# Patient Record
Sex: FEMALE | Race: WHITE | NOT HISPANIC OR LATINO | Employment: UNEMPLOYED
[De-identification: names, ages, dates, MRNs, and addresses within clinical notes are randomized per-mention and may not be internally consistent; named-entity substitution may affect disease eponyms.]

---
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
## 2020-01-16 ENCOUNTER — COMMUNICATION - HEALTHEAST (OUTPATIENT)
Dept: TELEHEALTH | Facility: CLINIC | Age: 20
End: 2020-01-16

## 2020-01-16 ENCOUNTER — OFFICE VISIT - HEALTHEAST (OUTPATIENT)
Dept: FAMILY MEDICINE | Facility: CLINIC | Age: 20
End: 2020-01-16

## 2020-01-16 DIAGNOSIS — M25.531 PAIN IN BOTH WRISTS: ICD-10-CM

## 2020-01-16 DIAGNOSIS — Z11.3 SCREENING FOR STD (SEXUALLY TRANSMITTED DISEASE): ICD-10-CM

## 2020-01-16 DIAGNOSIS — M25.532 PAIN IN BOTH WRISTS: ICD-10-CM

## 2020-01-16 DIAGNOSIS — Z00.00 ROUTINE GENERAL MEDICAL EXAMINATION AT A HEALTH CARE FACILITY: ICD-10-CM

## 2020-01-16 LAB — HIV 1+2 AB+HIV1 P24 AG SERPL QL IA: NEGATIVE

## 2020-01-16 ASSESSMENT — MIFFLIN-ST. JEOR: SCORE: 1518.15

## 2020-01-16 NOTE — ASSESSMENT & PLAN NOTE
Patient presents to establish care.  Concern of wrist pain as discussed elsewhere.  He is sexually active with male partners and was receptive to my recommendation to undergo STI screening.  We discussed that at some point he may want to consider prophylactic antivirals if his sexual practices change.  No need for anal swab chlamydia PCR.  Return to clinic 1 year, sooner as needed.   -Discussed substance abuse (vaping, marijuana).  I discouraged him from both of these practices.

## 2020-01-16 NOTE — ASSESSMENT & PLAN NOTE
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  He is already tried an extension brace.  Working diagnosis will be carpal tunnel syndrome.  Given persistence for greater than a year, I recommended evaluation by orthopedics.  Referral placed.

## 2020-01-17 ENCOUNTER — COMMUNICATION - HEALTHEAST (OUTPATIENT)
Dept: FAMILY MEDICINE | Facility: CLINIC | Age: 20
End: 2020-01-17

## 2020-01-17 LAB
C TRACH DNA SPEC QL PROBE+SIG AMP: NEGATIVE
HAV IGM SERPL QL IA: NEGATIVE
HBV CORE IGM SERPL QL IA: NEGATIVE
HBV SURFACE AG SERPL QL IA: NEGATIVE
HCV AB SERPL QL IA: NEGATIVE
N GONORRHOEA DNA SPEC QL NAA+PROBE: NEGATIVE
T PALLIDUM AB SER QL: NEGATIVE

## 2020-01-23 ENCOUNTER — RECORDS - HEALTHEAST (OUTPATIENT)
Dept: ADMINISTRATIVE | Facility: OTHER | Age: 20
End: 2020-01-23

## 2020-05-18 ENCOUNTER — VIRTUAL VISIT (OUTPATIENT)
Dept: FAMILY MEDICINE | Facility: OTHER | Age: 20
End: 2020-05-18

## 2021-06-04 VITALS
SYSTOLIC BLOOD PRESSURE: 120 MMHG | HEIGHT: 67 IN | RESPIRATION RATE: 16 BRPM | OXYGEN SATURATION: 99 % | DIASTOLIC BLOOD PRESSURE: 60 MMHG | HEART RATE: 102 BPM | BODY MASS INDEX: 19.46 KG/M2 | WEIGHT: 124 LBS | TEMPERATURE: 97.9 F

## 2021-06-16 PROBLEM — M25.532 PAIN IN BOTH WRISTS: Status: ACTIVE | Noted: 2020-01-16

## 2021-06-16 PROBLEM — M25.531 PAIN IN BOTH WRISTS: Status: ACTIVE | Noted: 2020-01-16

## 2021-06-16 PROBLEM — Z11.3 SCREENING FOR STD (SEXUALLY TRANSMITTED DISEASE): Status: ACTIVE | Noted: 2020-01-16

## 2021-06-16 PROBLEM — Z00.00 ROUTINE GENERAL MEDICAL EXAMINATION AT A HEALTH CARE FACILITY: Status: ACTIVE | Noted: 2020-01-16

## 2021-06-20 NOTE — LETTER
Letter by Matthew Ritter MD at      Author: Matthew Ritter MD Service: -- Author Type: --    Filed:  Encounter Date: 1/17/2020 Status: Signed         Adam J Behnken  [address] [place] [address]             January 17, 2020         Dear Mr. Behnken,    Below are the results from your recent visit:    Resulted Orders   Chlamydia trachomatis & Neisseria gonorrhoeae, Amplified Detection   Result Value Ref Range    Chlamydia trachomatis, Amplified Detection Negative Negative    Neisseria gonorrhoeae, Amplified Detection Negative Negative   HIV Antigen/Antibody Screening Cascade   Result Value Ref Range    HIV Antigen / Antibody Negative Negative    Narrative    Method is Abbott HIV Ag/Ab for the detection of HIV p24 antigen, HIV-1 antibodies and HIV-2 antibodies.   Syphilis Screen, Cascade   Result Value Ref Range    Treponema Antibody (Syphilis) Negative Negative   Hepatitis Acute Evaluation   Result Value Ref Range    Hepatitis A Antibody, IgM Negative Negative    Hepatitis B [place], IgM Negative Negative    Hepatitis B Surface Ag Negative Negative    Hepatitis C Ab Negative Negative       All testing was normal/negative.    Please call with questions or contact us using [employer].    Sincerely,      Electronically signed by Matthew Ritter MD

## 2021-06-28 NOTE — PROGRESS NOTES
Progress Notes by Matthew Ritter MD at 1/16/2020  1:40 PM     Author: Matthew Ritter MD Service: -- Author Type: Physician    Filed: 1/16/2020  2:41 PM Encounter Date: 1/16/2020 Status: Signed    : Matthew Ritter MD (Physician)        [facility] Well Child Check    ASSESSMENT & PLAN  Adam J Behnken is a 19 y.o. who has normal growth and normal development.    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  He is already tried an extension brace.  Working diagnosis will be carpal tunnel syndrome.  Given persistence for greater than a year, I recommended evaluation by orthopedics.  Referral placed.    Routine general medical examination at a health care facility  Patient presents to establish care.  Concern of wrist pain as discussed elsewhere.  He is sexually active with male partners and was receptive to my recommendation to undergo STI screening.  We discussed that at some point he may want to consider prophylactic antivirals if his sexual practices change.  No need for anal swab chlamydia PCR.  Return to clinic 1 year, sooner as needed.   -Discussed substance abuse (vaping, marijuana).  I discouraged him from both of these practices.    Return for annual exam in 1 year.     IMMUNIZATIONS/LABS  No immunizations due today.    REFERRALS  Dental:  The patient has already established care with a dentist.  Other:  No additional referrals were made at this time.    ANTICIPATORY GUIDANCE  I have reviewed age appropriate anticipatory guidance.    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      Roomed by:  Nargis   Accompanied by  self   Refills needed?  no        Do you have any significant health concerns in your family history?: No  Family History   Adopted: Yes     Since  your last visit, have there been any major changes in your family, such as a move, job change, separation, divorce, or death in the family?: Yes: moved out of parents house   Has a lack of transportation kept you from medical appointments?: No    Home  Who lives in your home?:  Pt lives with roommate   Social History     Social History Narrative   ? Not on file     Do you have any concerns about losing your housing?: No  Is your housing safe and comfortable?: Yes  Do you have any trouble with sleep?:  Yes: falling asleep     Education  Pt attended [employer] for biology     Eating  Do you eat regular meals including fruits and vegetables?:  yes  What are you drinking (cow's milk, water, soda, juice, sports drinks, energy drinks, etc)?: water, tea and energy drinks   Have you been worried that you don't have enough food?: No  Do you have concerns about your body or appearance?:  No    Activities  Do you have friends?:  yes  Do you get at least one hour of physical activity per day?:  yes  How many hours a day are you in front of a screen other than for schoolwork (computer, TV, phone)?:  4-5 hours per day  What do you do for exercise?:  Work out at gym   Do you have interest/participate in community activities/volunteers/school sports?: n/a     VISION/HEARING  Vision: Completed. See Results  Hearing:  Completed. See Results     Hearing Screening    125Hz 250Hz 500Hz 1000Hz 2000Hz 3000Hz 4000Hz 6000Hz 8000Hz   Right ear:   25 20 20  20 20    Left ear:   25 20 20  20 20       Visual Acuity Screening    Right eye Left eye Both eyes   Without correction: 10/10 10/10 10/10   With correction:      Comments: Lens plus-pass       MENTAL HEALTH SCREENING    TB Risk Assessment:  The patient and/or parent/guardian answer positive to:  no known risk of TB    Dyslipidemia Risk Screening  Have either of your parents or any of your grandparents had a stroke or heart attack before age 55?: unknown   Any parents with high  "cholesterol or currently taking medications to treat?: unknown      Dental  When was the last time you saw the dentist?: 6-12 months ago   Parent/Guardian declines the fluoride varnish application today. Fluoride not applied today.    Patient Active Problem List   Diagnosis   ? Screening for STD (sexually transmitted disease)   ? Routine general medical examination at a health care facility   ? Pain in both wrists       Drugs  Does the patient use tobacco/alcohol/drugs?:  Yes.  He uses alcohol intermittently in a social way.  He receives nicotine through an [employer].  He also smokes marijuana most nights.  He has not had legal problems.  He has never injected anything into his body.    Safety  Does the patient have any safety concerns (peer or home)?:  no  Does the patient use safety belts, helmets and other safety equipment?:  yes    Sex  Have you ever had sex?:  Have any of your past or current sex partners been infected with HIV, bisexual, or injection drug users?: Yes: Male partners.  Sexually active with oral sex.  He has not been receptive partner with anal intercourse.  No sexual contact with any individuals for at least 2 months.  Are you having unprotected sex with multiple partners?:  No  Have you or any of your partners ever been treated for a sexually transmitted infections?:  No    MEASUREMENTS  Height:  5' 6.5\" (1.689 m)  Weight: 124 lb (56.2 kg)  BMI: Body mass index is 19.71 kg/m .  Blood Pressure: 120/60  Growth percentile SmartLinks can only be used for patients less than 20 years old.    PHYSICAL EXAM  Physical Exam   Constitutional: He appears well-developed and well-nourished.   HENT:   Right Ear: External ear normal.   Left Ear: External ear normal.   Nose: Nose normal.   Mouth/Throat: Oropharynx is clear and moist.   Eyes: Pupils are equal, round, and reactive to light. Conjunctivae and EOM are normal. Right eye exhibits no discharge. Left eye exhibits no discharge.   Neck: No thyromegaly " present.   Cardiovascular: Normal rate, regular rhythm and normal heart sounds. Exam reveals no gallop and no friction rub.   No murmur heard.  Pulmonary/Chest: Effort normal and breath sounds normal. No respiratory distress. He has no wheezes. He has no rales.   Abdominal: Soft. Bowel sounds are normal. He exhibits no distension and no mass. There is no abdominal tenderness. There is no rebound and no guarding.   Musculoskeletal: Normal range of motion.         General: No edema.      Comments: Normal spinal curvature.  No joint swelling or deformity.   Lymphadenopathy:     He has no cervical adenopathy.   Neurological: He is alert. He has normal reflexes. No cranial nerve deficit.   Skin: Skin is warm and dry. No rash noted.        Psychiatric: He has a normal mood and affect.

## 2022-06-20 ENCOUNTER — NURSE TRIAGE (OUTPATIENT)
Dept: NURSING | Facility: CLINIC | Age: 22
End: 2022-06-20
Payer: COMMERCIAL

## 2022-06-20 NOTE — TELEPHONE ENCOUNTER
Reason for Disposition    Spreading redness around the boil and no fever    Additional Information    Negative: Widespread rash and bright red, sunburn-like and too weak to stand    Negative: Sounds like a life-threatening emergency to the triager    Negative: Widespread red rash    Negative: Black (necrotic) color or blisters develop in wound    Negative: Patient sounds very sick or weak to the triager    Negative: SEVERE pain (e.g., excruciating)    Negative: Red streak from area of infection    Negative: Fever > 100.4 F (38.0 C)    Negative: Boil > 2 inches across (> 5 cm; larger than a golf ball or ping pong ball)    Negative: Boil > 1/2 inch across (> 12 mm; larger than a marble) and center is soft or pus colored    Protocols used: BOIL (SKIN ABSCESS)-A-OH

## 2022-06-20 NOTE — TELEPHONE ENCOUNTER
"  S-(situation): Call from patient who was scheduled for annual physical but also had a lump he wanted to have assessed.    Patient reports he has a small lump near his right nipple that started 4 days ago. Area is red, warm to the touch; lump is hard lump and tender to touch; and says it looks a bit bruised. Pain level 2-3/10. Current about a dime-size (.7 \"); redness is about 1-1.5.\" Denies fever/chills, no other skins rashes/lumps.      B-(background):       A-(assessment): needs to be evaluated      R-(recommendations): be seen today.    Reviewed care advice with caller per RN triage protocol guideline.  Advised to call back with worsening symptoms, concerns or questions.   Caller verbalized understanding.          Jing Stuart RN/[facility]      "

## 2022-09-02 ENCOUNTER — OFFICE VISIT (OUTPATIENT)
Dept: FAMILY MEDICINE | Facility: CLINIC | Age: 22
End: 2022-09-02
Payer: COMMERCIAL

## 2022-09-02 VITALS
HEIGHT: 66 IN | BODY MASS INDEX: 21.34 KG/M2 | WEIGHT: 132.8 LBS | HEART RATE: 78 BPM | RESPIRATION RATE: 16 BRPM | DIASTOLIC BLOOD PRESSURE: 81 MMHG | SYSTOLIC BLOOD PRESSURE: 121 MMHG | OXYGEN SATURATION: 99 % | TEMPERATURE: 97.8 F

## 2022-09-02 DIAGNOSIS — Z79.890 HORMONE REPLACEMENT THERAPY (HRT): ICD-10-CM

## 2022-09-02 DIAGNOSIS — F64.9 GENDER DYSPHORIA: Primary | ICD-10-CM

## 2022-09-02 PROCEDURE — 99213 OFFICE O/P EST LOW 20 MIN: CPT | Mod: GC

## 2022-09-02 RX ORDER — TRAZODONE HYDROCHLORIDE 100 MG/1
100 TABLET ORAL
COMMUNITY
Start: 2022-07-30

## 2022-09-02 RX ORDER — SPIRONOLACTONE 50 MG/1
50 TABLET, FILM COATED ORAL
COMMUNITY
Start: 2022-08-18

## 2022-09-02 RX ORDER — CLONAZEPAM 0.5 MG/1
0.5 TABLET ORAL
COMMUNITY
Start: 2022-07-27

## 2022-09-02 RX ORDER — ESTRADIOL 2 MG/1
2 TABLET ORAL
COMMUNITY
Start: 2022-08-18 | End: 2023-12-08

## 2022-09-02 RX ORDER — VENLAFAXINE HYDROCHLORIDE 37.5 MG/1
37.5 CAPSULE, EXTENDED RELEASE ORAL
COMMUNITY
Start: 2022-07-14 | End: 2023-12-06

## 2022-09-02 RX ORDER — ALBUTEROL SULFATE 90 UG/1
2 AEROSOL, METERED RESPIRATORY (INHALATION)
COMMUNITY
Start: 2022-08-02

## 2022-09-02 RX ORDER — PROGESTERONE 100 MG/1
200 CAPSULE ORAL
COMMUNITY
Start: 2022-08-18

## 2022-09-02 RX ORDER — LAMOTRIGINE 25 MG/1
50 TABLET ORAL
COMMUNITY
Start: 2022-07-07

## 2022-09-02 ASSESSMENT — ANXIETY QUESTIONNAIRES
5. BEING SO RESTLESS THAT IT IS HARD TO SIT STILL: SEVERAL DAYS
GAD7 TOTAL SCORE: 9
GAD7 TOTAL SCORE: 9
IF YOU CHECKED OFF ANY PROBLEMS ON THIS QUESTIONNAIRE, HOW DIFFICULT HAVE THESE PROBLEMS MADE IT FOR YOU TO DO YOUR WORK, TAKE CARE OF THINGS AT HOME, OR GET ALONG WITH OTHER PEOPLE: VERY DIFFICULT
7. FEELING AFRAID AS IF SOMETHING AWFUL MIGHT HAPPEN: SEVERAL DAYS
1. FEELING NERVOUS, ANXIOUS, OR ON EDGE: MORE THAN HALF THE DAYS
3. WORRYING TOO MUCH ABOUT DIFFERENT THINGS: MORE THAN HALF THE DAYS
2. NOT BEING ABLE TO STOP OR CONTROL WORRYING: SEVERAL DAYS
6. BECOMING EASILY ANNOYED OR IRRITABLE: SEVERAL DAYS

## 2022-09-02 ASSESSMENT — PATIENT HEALTH QUESTIONNAIRE - PHQ9
SUM OF ALL RESPONSES TO PHQ QUESTIONS 1-9: 5
5. POOR APPETITE OR OVEREATING: SEVERAL DAYS

## 2022-09-02 ASSESSMENT — ASTHMA QUESTIONNAIRES: ACT_TOTALSCORE: 21

## 2022-09-02 NOTE — PATIENT INSTRUCTIONS
Family therapy resources:  [facility]  Mental health resources and counseling for queer and trans youth and their families.  [email or web address]  Email: [email or web address]   Phone: [phone]   [facility]   [email or web address]  [facility]  [email or web address].    For parents:  [employer]: Understanding Our Identity Journey  12-week education group for family members of queer and trans youth. Requires advanced registration and includes a $300 fee (sliding fee available). First cycle begins September 2022. Email [email or web address] or call [phone].        If insurance doesn't cover electrolysis well:  [facility] [employer] Electrolysis support program  Provides free or greatly discounted permanent hair removal services  https://www.[facility].org/electrolysis-support-fund    Patient Education   Here is the plan from today's visit    Gender dysphoria  You should get a call in the next few weeks to start the process of bottom surgery  - Comprehensive Gender Care Referral - Internal; Future    Thank you for coming to [facility] today.  Lab Testing:  **If you had lab testing today and your results are reassuring or normal they will be mailed to you or sent through [employer] within 7 days.   **If the lab tests need quick action we will call you with the results.  **If you are having labs done on a different day, please call [phone] to schedule at [facility] or [phone] for other [facility] locations. Labs do not offer walk-in appointments.  The phone number we will call with results is # [phone] (home) . If this is not the best number please call our clinic and change the number.  Medication Refills:  If you need any refills please call your pharmacy and they will contact us.   If you need to  your refill at a new pharmacy, please contact the new pharmacy directly. The new pharmacy will help you get your  medications transferred faster.   Scheduling:  If you have any concerns about today's visit or wish to schedule another appointment please call our office during normal business hours [phone] (8-5:00 M-F)  If a referral was made to an [facility] specialty provider and you do not get a call from central scheduling, please refer to directions on your visit summary or call our office during normal business hours for assistance.   If a Mammogram was ordered for you at the [facility] call [phone] to schedule or change your appointment.  If you had an XRay/CT/Ultrasound/MRI ordered the number is [phone] to schedule or change your radiology appointment.   [facility] has limited ultrasound appointments available on Wednesdays, if you would like your ultrasound at [facility], please call [phone] to schedule.   Medical Concerns:  If you have urgent medical concerns please call [phone] at any time of the day.    Judith Garcia MD

## 2022-09-02 NOTE — PROGRESS NOTES
Assessment & Plan       This is a 22 year old patient who uses she/her prounouns here for a referral for vaginoplasty (with orchiectomy) and electrolysis, as well as information on resources for family therapy.     Gender dysphoria  Hormone replacement therapy (HRT)  Established diagnosis of gender dysphoria. On HRT for 1 month  8/18/22. Discussed usual process of electrolysis and HRT therapy for some time before surgery. She's done a lot of research and understands the process well. Placing referral today  Provided information in AVS on family therapy at mom's request  - Comprehensive Gender Care Referral - Internal; Future  - Currently on the following meds, managed by other provider, would like to continue seeing other provider for HRT but will call if she would like to switch to [facility] for primary care and HRT management.     estradiol 2mg BID (4mg)    spironolactone 50mg BID (100mg)     prometrium 200mg at bedtime (max dose)           Nicotine/Tobacco Cessation:  She reports that she has been smoking. She has never used smokeless tobacco.  Nicotine/Tobacco Cessation Plan:   Information offered: Patient not interested at this time      See Patient Instructions    No follow-ups on file.    Judith Garcia MD  [facility] SANAM Gaines is a 22 year old accompanied by her mother, presenting for the following health issues:        HPI         On HRT since 8/18/22, sees Riana for this, working with psychiatrist and psychologist to get letters for bottom surgery  Would like to get process started  Good support - supportive family and friends, financially stable, lives with family in home  Currently on  estradiol 2mg BID (4mg)  spironolactone 50mg BID (100mg)   prometrium 200mg at bedtime (max dose)      Review of Systems   Constitutional, HEENT, cardiovascular, pulmonary, gi and gu systems are negative, except as otherwise noted.      Objective    /81   Pulse 78   Temp 97.8  F (36.6  " C) (Oral)   Resp 16   Ht 1.676 m (5' 6\")   Wt 60.2 kg (132 lb 12.8 oz)   SpO2 99%   BMI 21.43 kg/m    Body mass index is 21.43 kg/m .    Physical Exam   Physical Exam    General: Alert and oriented, in no acute distress.  Skin: Warm and dry, no abnormalities noted.  Eyes: Extra-ocular muscles grossly intact, pupils equal.  ENT: Speech intact, nasal passages open, no hearing impairment noted.  CV: No cyanosis or pallor, warm and well perfused.  Respiratory: No respiratory distress, no accessory muscle use.  Neuro: Gait and station normal, comprehension intact. Gross and fine motor skills intact.   Psychiatric: Mood and affect appear normal.   Extremities: Warm, able to move all four extremities at will.          ----- Service Performed and Documented by Resident or Fellow ------          "

## 2022-09-08 NOTE — PROGRESS NOTES
Preceptor Attestation:    I discussed the patient with the resident and evaluated the patient in person. I have verified the content of the note, which accurately reflects my assessment of the patient and the plan of care.   Supervising Physician:  Pati Alvarez DO.

## 2022-09-25 ENCOUNTER — HEALTH MAINTENANCE LETTER (OUTPATIENT)
Age: 22
End: 2022-09-25

## 2022-09-28 DIAGNOSIS — F64.9 GENDER DYSPHORIA: Primary | ICD-10-CM

## 2022-10-17 ENCOUNTER — TELEPHONE (OUTPATIENT)
Dept: PLASTIC SURGERY | Facility: CLINIC | Age: 22
End: 2022-10-17

## 2022-10-17 NOTE — TELEPHONE ENCOUNTER
Writer discussed vaginoplasty, hair removal, and letters of support with pt.  Pt stated she has two mental health providers who could write LOS. Writer added her to Dr. Thompson's waitlist for vaginoplasty consultations.

## 2022-11-29 ENCOUNTER — TELEPHONE (OUTPATIENT)
Dept: PLASTIC SURGERY | Facility: CLINIC | Age: 22
End: 2022-11-29

## 2022-11-29 NOTE — CONFIDENTIAL NOTE
[facility] :  Care Coordination Note     SITUATION   Sofia Behnken (she/her) is a 22 year old adult who is receiving support for:  Care Team  .    BACKGROUND     Pt is scheduled for a full depth vaginoplasty consult with Dr. Thompson on 5/30/23.     Pt has started hair removal as of 11/29/22.     ASSESSMENT     Surgery              CGC Assessment  Comprehensive Gender Care (CGC) Enrollment: Enrolled  Patient has a therapist: Yes  Name of therapist: Miguel Angel Guzman  Letter of support #1: Requested  Letter of support #2: Requested  Surgery being considered: Yes  Vaginoplasty: Yes    Pt reports:   No smoking  HRT for 3 months (as of Nov. 2022)  No other gender-affirming surgeries      PLAN          Nursing Interventions:       Follow-up plan:    1. Upload LOS - pt will likely need to get them updated.   2. Continue hair removal.        Grazyna Cason

## 2023-01-09 NOTE — TELEPHONE ENCOUNTER
FUTURE VISIT INFORMATION      FUTURE VISIT INFORMATION:    Date: 4/25/23    Time: 2:00pm    [place] [facility]  REFERRAL INFORMATION:    Reason for visit/diagnosis  vaginoplasty    RECORDS REQUESTED FROM:       No recs to collect

## 2023-01-12 ENCOUNTER — MYC MEDICAL ADVICE (OUTPATIENT)
Dept: UROLOGY | Facility: CLINIC | Age: 23
End: 2023-01-12

## 2023-02-24 ENCOUNTER — MYC MEDICAL ADVICE (OUTPATIENT)
Dept: UROLOGY | Facility: CLINIC | Age: 23
End: 2023-02-24

## 2023-02-27 NOTE — TELEPHONE ENCOUNTER
Ozzie, Jeremiah  You 4 hours ago (10:58 AM)     JONH Alvarenga, We do take [employer] Plus ins. so I have called and left the Patient a message that we do, and provided the customer service phone no. For her to call them with any other questions. Thanks

## 2023-04-20 ENCOUNTER — TELEPHONE (OUTPATIENT)
Dept: PLASTIC SURGERY | Facility: CLINIC | Age: 23
End: 2023-04-20
Payer: COMMERCIAL

## 2023-04-20 NOTE — TELEPHONE ENCOUNTER
Appt reminder call for [facility] vaginoplasty consult 4/25/23. No answer, LVMAURISIO.     Derian Ortiz

## 2023-04-25 ENCOUNTER — PRE VISIT (OUTPATIENT)
Dept: UROLOGY | Facility: CLINIC | Age: 23
End: 2023-04-25

## 2023-04-25 ENCOUNTER — OFFICE VISIT (OUTPATIENT)
Dept: PLASTIC SURGERY | Facility: CLINIC | Age: 23
End: 2023-04-25
Attending: FAMILY MEDICINE
Payer: COMMERCIAL

## 2023-04-25 VITALS
HEART RATE: 81 BPM | BODY MASS INDEX: 22.9 KG/M2 | HEIGHT: 66 IN | DIASTOLIC BLOOD PRESSURE: 79 MMHG | SYSTOLIC BLOOD PRESSURE: 132 MMHG | WEIGHT: 142.5 LBS | OXYGEN SATURATION: 96 %

## 2023-04-25 DIAGNOSIS — Z79.890 HORMONE REPLACEMENT THERAPY (HRT): ICD-10-CM

## 2023-04-25 DIAGNOSIS — F64.9 GENDER DYSPHORIA: Primary | ICD-10-CM

## 2023-04-25 PROCEDURE — 99205 OFFICE O/P NEW HI 60 MIN: CPT | Performed by: UROLOGY

## 2023-04-25 ASSESSMENT — PAIN SCALES - GENERAL: PAINLEVEL: NO PAIN (0)

## 2023-04-25 NOTE — PROGRESS NOTES
Name: Sofia J Behnken    MRN: [id number]   YOB: 2000                 Chief Complaint:   Gender Dysphoria            History of Present Illness:   Deana is a 22 year old transgender female seen in consultation for gender dysphoria. Here with her mom, Yudi.    Patient transitioned [id number]  Preferred pronouns are: she/her  The patient has been on exogenous hormones since: August 2022. (Estradiol and jacinta and progesterone).  In terms of an intimate relationship, the patient does .  In terms of fertility, the patient: no desire for biologic children.     (New)  The patient has obtained letters of support from two providers.     (Prior Surgery)  The patient has previously undergone no gender affirming surgeries.     Long-term surgical goals for the patient include: FD vaginoplasty with peritoneal flaps. Also interested in breast augmentation    The patient is here today expressing interest in FD vaginoplasty with peritoneal tissue .    The patient has begun hair removal. (4-5 sessions so far at [facility]).          Past Medical History:     Past Medical History:   Diagnosis Date     Anxiety      Bipolar disorder (H)      Depression      Depressive disorder      Uncomplicated asthma             Past Surgical History:     Past Surgical History:   Procedure Laterality Date     EYE SURGERY      blocked tear duct             Social History:     Social History     Tobacco Use     Smoking status: Former     Smokeless tobacco: Never     Tobacco comments:     pt uses e cig   Vaping Use     Vaping status: Not on file   Substance Use Topics     Alcohol use: Yes     Comment: Alcoholic Drinks/day:6  drinks per week            Family History:     Family History   Adopted: Yes              Allergies:     Allergies   Allergen Reactions     Doxycycline GI Disturbance     Other reaction(s): GI intolerance  Nausea and vomiting and diarrhea after starting medication  Nausea and vomiting and diarrhea after starting  "medication       Azithromycin Rash            Medications:     Current Outpatient Medications   Medication Sig     albuterol (PROAIR HFA/PROVENTIL HFA/VENTOLIN HFA) 108 (90 Base) MCG/ACT inhaler Inhale 2 puffs into the lungs     clonazePAM (KLONOPIN) 0.5 MG tablet Take 0.5 mg by mouth     estradiol (ESTRACE) 2 MG tablet Take 2 mg by mouth     lamoTRIgine (LAMICTAL) 25 MG tablet Take 50 mg by mouth     progesterone (PROMETRIUM) 100 MG capsule Take 200 mg by mouth     spironolactone (ALDACTONE) 50 MG tablet Take 50 mg by mouth     traZODone (DESYREL) 100 MG tablet Take 100 mg by mouth     venlafaxine (EFFEXOR XR) 37.5 MG 24 hr capsule Take 37.5 mg by mouth (Patient not taking: Reported on 4/25/2023)     No current facility-administered medications for this visit.             Review of Systems:    ROS: ROS neg other than the symptoms noted above in the HPI.          Physical Exam:   /79   Pulse 81   Ht 1.676 m (5' 6\")   Wt 64.6 kg (142 lb 8 oz)   SpO2 96%   BMI 23.00 kg/m    General: age-appropriate in NAD  Resp: no respiratory distress  : deferred  Neuro: grossly intact  Motor: excellent strength throughout  Skin: clear of rashes or ecchymoses.           Assessment and Plan:   22 year old transgender female with gender dysphoria    The criteria for genital surgery are specific to the type of surgery being requested.  Criteria for bottom surgery:    1. Persistent, well documented gender dysphoria;  2. Capacity to make a fully informed decision and to consent for treatment;  3. Age of consent (>18 years old)  4. If significant medical or mental health concerns are present, they must be well controlled.  5. 12 continuous months of hormone therapy as appropriate to the patient s gender goals (unless  the patient has a medical contraindication or is otherwise unable or unwilling to take  Hormones). - Patient will have 12 months of hormone therapy in August 2023.  6. Two letters of support    The aim of hormone " therapy prior to gonadectomy is primarily to introduce a period of reversible  estrogen or testosterone suppression, before the patient undergoes irreversible surgical intervention.    I reviewed the steps of orchiectomy. I reviewed the surgical procedure. I reviewed the risks and benefits including bleeding, infection and irreversible nature of the procedure. The patient would like orchiectomy as part of vaginoplasty.    Hair removal is a requirement prior to full depth vaginoplasty as the genital skin will be placed in the vaginal cavity. Lack of hair removal would lead to complications related to intravaginal hair. This is nearly impossible to remove postoperatively.    She has a persistent, well documented gender dysphoria. She has capacity to make a fully informed decision and to consent for treatment. Her mental health issues are well controlled. She has been on continuous hormones for years. She has two letters of support which she will upload to [employer].     The patient meets all of these criteria. We discussed that gender affirmation surgery should be considered permanent. We discussed risks/complications of rectal injury, rectovaginal fistula, bleeding, fluid collection, infection, injury to surrounding structures, flap loss, sensory loss, wound dehiscence, vaginal prolapse, vaginal shrinkage/stenosis, need for lifelong dilation, urinary stream abnormalities, DVT/PE and need for revision surgery.     We discussed the option for minimal depth and full depth. She would like full depth with peritoneal tissue     We also discussed the need to stop hormones carol-procedurally for 2 weeks before and after surgery.     We discussed that transgender vaginoplasty for this patient would include: penectomy, orchiectomy, clitoroplasty, labiaplasty, urethral reconstruction, creation of a vagina, skin graft, colpopexy to suspend the vagina, and scrotectomy.     Needs to complete her hair removal  Not ready for prior  auth    Plan: Patient will finish hair removal and contact us to schedule her hair check with Dr Thompson. Will connect patient with ENT and plastics to schedule consults for tracheal shave and breast augmentation.    F/U: Patient will upload LOS to [facility] and complete hair removal. She will contact us to schedule hair check once done with hair removal.       Physician Attestation   I, Yang Thompson MD, saw this patient and agree with the findings and plan of care as documented in the note.      Yang Thompson MD  Reconstructive Urology    60 minutes spent by me on the date of the encounter doing chart review, history and exam, documentation and further activities per the note

## 2023-04-25 NOTE — NURSING NOTE
"Chief Complaint   Patient presents with     Consult     Vaginoplasty       Vitals:    04/25/23 1413   BP: 132/79   Pulse: 81   SpO2: 96%   Weight: 64.6 kg (142 lb 8 oz)   Height: 1.676 m (5' 6\")       Body mass index is 23 kg/m .          KATELYN HALL EMT    "

## 2023-04-25 NOTE — LETTER
4/25/2023       RE: Adam J Behnken  [address] [address]     Dear Colleague,    Thank you for referring your patient, Adam J Behnken, to the [facility] [place] at [facility]. Please see a copy of my visit note below.        Name: Sofia J Behnken    MRN: [id number]   YOB: 2000                 Chief Complaint:   Gender Dysphoria            History of Present Illness:   Deana is a 22 year old transgender female seen in consultation for gender dysphoria. Here with her mom, Yudi.    Patient transitioned [phone]  Preferred pronouns are: she/her  The patient has been on exogenous hormones since: August 2022. (Estradiol and jacinta and progesterone).  In terms of an intimate relationship, the patient does .  In terms of fertility, the patient: no desire for biologic children.     (New)  The patient has obtained letters of support from two providers.     (Prior Surgery)  The patient has previously undergone no gender affirming surgeries.     Long-term surgical goals for the patient include: FD vaginoplasty with peritoneal flaps. Also interested in breast augmentation    The patient is here today expressing interest in FD vaginoplasty with peritoneal tissue .    The patient has begun hair removal. (4-5 sessions so far at [facility]).          Past Medical History:     Past Medical History:   Diagnosis Date    Anxiety     Bipolar disorder (H)     Depression     Depressive disorder     Uncomplicated asthma             Past Surgical History:     Past Surgical History:   Procedure Laterality Date    EYE SURGERY      blocked tear duct             Social History:     Social History     Tobacco Use    Smoking status: Former    Smokeless tobacco: Never    Tobacco comments:     pt uses e cig   Vaping Use    Vaping status: Not on file   Substance Use Topics    Alcohol use: Yes     Comment: Alcoholic  "Drinks/day:6  drinks per week            Family History:     Family History   Adopted: Yes              Allergies:     Allergies   Allergen Reactions    Doxycycline GI Disturbance     Other reaction(s): GI intolerance  Nausea and vomiting and diarrhea after starting medication  Nausea and vomiting and diarrhea after starting medication      Azithromycin Rash            Medications:     Current Outpatient Medications   Medication Sig    albuterol (PROAIR HFA/PROVENTIL HFA/VENTOLIN HFA) 108 (90 Base) MCG/ACT inhaler Inhale 2 puffs into the lungs    clonazePAM (KLONOPIN) 0.5 MG tablet Take 0.5 mg by mouth    estradiol (ESTRACE) 2 MG tablet Take 2 mg by mouth    lamoTRIgine (LAMICTAL) 25 MG tablet Take 50 mg by mouth    progesterone (PROMETRIUM) 100 MG capsule Take 200 mg by mouth    spironolactone (ALDACTONE) 50 MG tablet Take 50 mg by mouth    traZODone (DESYREL) 100 MG tablet Take 100 mg by mouth    venlafaxine (EFFEXOR XR) 37.5 MG 24 hr capsule Take 37.5 mg by mouth (Patient not taking: Reported on 4/25/2023)     No current facility-administered medications for this visit.             Review of Systems:    ROS: ROS neg other than the symptoms noted above in the HPI.          Physical Exam:   /79   Pulse 81   Ht 1.676 m (5' 6\")   Wt 64.6 kg (142 lb 8 oz)   SpO2 96%   BMI 23.00 kg/m    General: age-appropriate in NAD  Resp: no respiratory distress  : deferred  Neuro: grossly intact  Motor: excellent strength throughout  Skin: clear of rashes or ecchymoses.           Assessment and Plan:   22 year old transgender female with gender dysphoria    The criteria for genital surgery are specific to the type of surgery being requested.  Criteria for bottom surgery:    1. Persistent, well documented gender dysphoria;  2. Capacity to make a fully informed decision and to consent for treatment;  3. Age of consent (>18 years old)  4. If significant medical or mental health concerns are present, they must be well " controlled.  5. 12 continuous months of hormone therapy as appropriate to the patient s gender goals (unless  the patient has a medical contraindication or is otherwise unable or unwilling to take  Hormones). - Patient will have 12 months of hormone therapy in August 2023.  6. Two letters of support    The aim of hormone therapy prior to gonadectomy is primarily to introduce a period of reversible  estrogen or testosterone suppression, before the patient undergoes irreversible surgical intervention.    I reviewed the steps of orchiectomy. I reviewed the surgical procedure. I reviewed the risks and benefits including bleeding, infection and irreversible nature of the procedure. The patient would like orchiectomy as part of vaginoplasty.    Hair removal is a requirement prior to full depth vaginoplasty as the genital skin will be placed in the vaginal cavity. Lack of hair removal would lead to complications related to intravaginal hair. This is nearly impossible to remove postoperatively.    She has a persistent, well documented gender dysphoria. She has capacity to make a fully informed decision and to consent for treatment. Her mental health issues are well controlled. She has been on continuous hormones for years. She has two letters of support which she will upload to [employer].     The patient meets all of these criteria. We discussed that gender affirmation surgery should be considered permanent. We discussed risks/complications of rectal injury, rectovaginal fistula, bleeding, fluid collection, infection, injury to surrounding structures, flap loss, sensory loss, wound dehiscence, vaginal prolapse, vaginal shrinkage/stenosis, need for lifelong dilation, urinary stream abnormalities, DVT/PE and need for revision surgery.     We discussed the option for minimal depth and full depth. She would like full depth with peritoneal tissue     We also discussed the need to stop hormones carol-procedurally for 2 weeks before  and after surgery.     We discussed that transgender vaginoplasty for this patient would include: penectomy, orchiectomy, clitoroplasty, labiaplasty, urethral reconstruction, creation of a vagina, skin graft, colpopexy to suspend the vagina, and scrotectomy.     Needs to complete her hair removal  Not ready for prior auth    Plan: Patient will finish hair removal and contact us to schedule her hair check with Dr Thompson. Will connect patient with ENT and plastics to schedule consults for tracheal shave and breast augmentation.    F/U: Patient will upload LOS to [facility] and complete hair removal. She will contact us to schedule hair check once done with hair removal.       Physician Attestation   I, Yang Thompson MD, saw this patient and agree with the findings and plan of care as documented in the note.      60 minutes spent by me on the date of the encounter doing chart review, history and exam, documentation and further activities per the note             Again, thank you for allowing me to participate in the care of your patient.      Sincerely,    Yang Thompson MD

## 2023-04-26 ENCOUNTER — TELEPHONE (OUTPATIENT)
Dept: PLASTIC SURGERY | Facility: CLINIC | Age: 23
End: 2023-04-26
Payer: COMMERCIAL

## 2023-04-26 NOTE — CONFIDENTIAL NOTE
[facility] :  Care Coordination Note     SITUATION   Sofia Behnken (she/her) is a 22 year old adult who is receiving support for:  Care Team  .    BACKGROUND     Pt is scheduled for a gender affirming breast augmentation consult with Dr. Kim on 8/17/23.     ASSESSMENT     Surgery              CGC Assessment  Comprehensive Gender Care (CGC) Enrollment: (P) Enrolled  Patient has a therapist: (P) Yes  Name of therapist: (P) Miguel Angel Guzman  Letter of support #1: (P) Requested  Surgery being considered: (P) Yes  Augmentation: (P) Yes  Hormones at least 12mo: (P) No    Pt reports:   No nicotine  Pursuing vaginoplasty with Dr. Thompson  HRT since Nov. 2022      PLAN          Nursing Interventions:       Follow-up plan:  1. Obtain LOS for top surgery.        Grazyna Cason

## 2023-04-26 NOTE — CONFIDENTIAL NOTE
Writer EZEKIEL re: follow up on pt interest in breast augmentation. Pt informed Alex during her 4/25/23 appt with Dr. Thompson and they sent an in basket message. Writer also sent mychart to pt.

## 2023-07-06 NOTE — TELEPHONE ENCOUNTER
FUTURE VISIT INFORMATION:      FUTURE VISIT INFORMATION:    Date: 9/28/23    Time: 1 pm    Location: [facility]  REFERRAL INFORMATION:    Referring provider: Anthony Tran DO    Referring providers clinic: [facility]    Reason for visit/diagnosis:  Anthony Murray DO in [facility]    RECORDS REQUESTED FROM:       Clinic name Comments Records Status Imaging Status   [facility] [place] 4/25/23 note- Yang Thompson MD Epic

## 2023-07-22 ENCOUNTER — NURSE TRIAGE (OUTPATIENT)
Dept: NURSING | Facility: CLINIC | Age: 23
End: 2023-07-22
Payer: COMMERCIAL

## 2023-07-22 NOTE — TELEPHONE ENCOUNTER
"Nurse Triage SBAR    Is this a 2nd Level Triage? NO    Situation: Chest pain with cough and shortness of breath  Consent: not needed    Background: none    Assessment: throat is painful in the chest- worse with coughing and deep breath in- goes into the chest and collarbones- states feels tight  Notes nausea- notes this is common for her- no vomited  Noted yesterday she was getting more hot    No pain elsewhere  Started 2- 3 days ago   Indicates constant- certain times is it worse- walking and breathing heavier is worse- indicates in the AM the pain was bad as well  4/10 currently but was a 6/10 earlier  Has a history of asthma - using inhaler but not helping  Hormones- 8 mg estradiol, 200 mg progesterone,  100 mg spironolactone   Notes a cough as well- intermittent cough- notes very low in her chest   Intermittent dizziness and had to leave work - \"felt out of it\"   States her heart felt like it was in her throat and felt a pressure      Protocol Recommended Disposition:       Recommendation: advised she be seen in the ER today- reviewed additional care advice with patient and she verbalizes understanding. Will go to ER in [place].      ER now    Does the patient meet one of the following criteria for ADS visit consideration? No      Brea Bradford RN 10:03 AM 7/22/2023  Reason for Disposition   Difficulty breathing    Additional Information   Negative: SEVERE difficulty breathing (e.g., struggling for each breath, speaks in single words)   Negative: Difficult to awaken or acting confused (e.g., disoriented, slurred speech)   Negative: Shock suspected (e.g., cold/pale/clammy skin, too weak to stand, low BP, rapid pulse)   Negative: Passed out (i.e., lost consciousness, collapsed and was not responding)   Negative: [1] Chest pain lasts > 5 minutes AND [2] age > 44   Negative: [1] Chest pain lasts > 5 minutes AND [2] age > 30 AND [3] one or more cardiac risk factors (e.g., diabetes, high blood pressure, high " "cholesterol, smoker, or strong family history of heart disease)   Negative: [1] Chest pain lasts > 5 minutes AND [2] history of heart disease (i.e., angina, heart attack, heart failure, bypass surgery, takes nitroglycerin)   Negative: [1] Chest pain lasts > 5 minutes AND [2] described as crushing, pressure-like, or heavy   Negative: Heart beating < 50 beats per minute OR > 140 beats per minute   Negative: Visible sweat on face or sweat dripping down face   Negative: Sounds like a life-threatening emergency to the triager   Negative: Followed a chest injury   Negative: SEVERE chest pain   Negative: [1] Chest pain (or \"angina\") comes and goes AND [2] is happening more often (increasing in frequency) or getting worse (increasing in severity) (Exception: chest pains that last only a few seconds)   Negative: Pain also in shoulder(s) or arm(s) or jaw (Exception: pain is clearly made worse by movement)    Protocols used: Chest Pain-A-AH    "

## 2023-08-17 ENCOUNTER — OFFICE VISIT (OUTPATIENT)
Dept: PLASTIC SURGERY | Facility: AMBULATORY SURGERY CENTER | Age: 23
End: 2023-08-17
Payer: COMMERCIAL

## 2023-08-17 VITALS
BODY MASS INDEX: 22.34 KG/M2 | HEIGHT: 66 IN | DIASTOLIC BLOOD PRESSURE: 62 MMHG | WEIGHT: 139 LBS | SYSTOLIC BLOOD PRESSURE: 100 MMHG

## 2023-08-17 DIAGNOSIS — Z98.82 S/P BREAST AUGMENTATION: Primary | ICD-10-CM

## 2023-08-17 DIAGNOSIS — F64.9 GENDER DYSPHORIA: ICD-10-CM

## 2023-08-17 PROCEDURE — 99204 OFFICE O/P NEW MOD 45 MIN: CPT | Performed by: PLASTIC SURGERY

## 2023-08-17 NOTE — LETTER
8/17/2023         RE: Sofia J Behnken  [address] [facility] [address]        Dear Colleague,    Thank you for referring your patient, Sofia J Behnken, to the [facility] [place]. Please see a copy of my visit note below.    Chief complaint:  Gender dysphoria, consultation for top surgery     History of present illness:  This is a 23 year old trans female who comes today for consultation regarding top surgery.  Pronouns she/her/hers.  Patient's name is Deana.  At this time the letter of support is pending. Currently she is on estrogen treatment.  She has been receiving estrogen for the last 1 year.  This patient has been experiencing gender dysphoria for the last 20 years.     Past medical history:  Past Medical History:   Diagnosis Date     Anxiety      Bipolar disorder (H)      Depression      Depressive disorder      Uncomplicated asthma        Gender dysphoria     Past surgical history:     Right eye surgery     Allergies:  Macrolides and doxycycline     Medications:    Current Outpatient Medications:      albuterol (PROAIR HFA/PROVENTIL HFA/VENTOLIN HFA) 108 (90 Base) MCG/ACT inhaler, Inhale 2 puffs into the lungs, Disp: , Rfl:      clonazePAM (KLONOPIN) 0.5 MG tablet, Take 0.5 mg by mouth, Disp: , Rfl:      estradiol (ESTRACE) 2 MG tablet, Take 2 mg by mouth, Disp: , Rfl:      lamoTRIgine (LAMICTAL) 25 MG tablet, Take 50 mg by mouth, Disp: , Rfl:      progesterone (PROMETRIUM) 100 MG capsule, Take 200 mg by mouth, Disp: , Rfl:      spironolactone (ALDACTONE) 50 MG tablet, Take 50 mg by mouth, Disp: , Rfl:      traZODone (DESYREL) 100 MG tablet, Take 100 mg by mouth, Disp: , Rfl:      venlafaxine (EFFEXOR XR) 37.5 MG 24 hr capsule, Take 37.5 mg by mouth (Patient not taking: Reported on 4/25/2023), Disp: , Rfl:      Family history:  Noncontributory     Social History:  Denies tobacco, drinks alcohol socially     Review of systems:  General ROS: No complaints or  "constitutional symptoms  Skin: No complaints or symptoms   Hematologic/Lymphatic: No symptoms or complaints  Psychiatric: Patient presents with gender dysphoria  Endocrine: No excessive fatigue, no hypermetabolic symptoms reported  Respiratory ROS: No cough, shortness of breath, or wheezing  Cardiovascular ROS: No chest pain or dyspnea on exertion  Breast ROS: Denies nipple discharge, denies palpable breast masses, denies peau d'orange.  Gastrointestinal ROS: No abdominal pain, nausea, diarrhea, or constipation  Musculoskeletal ROS: No recent injuries reported  Neurological ROS: No focal neurologic defects reported.       Physical exam:     /62   Ht 1.676 m (5' 6\")   Wt 63 kg (139 lb)   BMI 22.44 kg/m    General: Alert, cooperative, appears stated age   Skin: Skin color, texture, turgor normal, no rashes or lesions   Lymphatic: No obvious adenopathy, no swelling   Eyes: No scleral icterus, pupils equal  HENT: No traumatic injury to the head or face, no gross abnormalities  Lungs: Normal respiratory effort, breath sounds equal bilaterally  Heart: Regular rate and rhythm  Breasts:  Bilateral glandular hypertrophy, with grade 1 ptosis.  No nipple inversion, no nipple discharge, no palpable breast masses.    Left nipple areolar complex is slightly higher compared to the right one.  On the right breast sternal notch to nipple distance is 20 cm, nipple to inframammary fold is 6 cm, and breast diameter is 10 cm.  Of the left breast sternal notch to nipple distance is 19 cm, nipple to inframammary fold distance is 7 cm, and breast diameter is 11 cm.  Pinch thickness test on the skin of both breasts is 3.5 cm.  No palpable axillary lymphadenopathies bilaterally.  Abdomen: Soft, non-distended and non-tender to palpation  Neurologic: Grossly intact              Assessment:     23 year old  trans female with history of gender dysphoria.     PLAN:      I believe that Deana is a good candidate for gender affirming top " "surgery with bilateral prepectoral augmentation mammoplasties (her skin pinch test thickness is greater than 2 cm), via inframammary fold approach, with lowering of the inframammary fold at least 2 cm down from its current position (soft tissue rearrangement).  Furthermore, she will need obliteration of the current inframammary fold which is at least 1-2 cm higher from where is supposed to be.  Based upon her breast diameter I will bring to the operating room full 325 profile, cohesive, breast implants with 325 cc, 345 cc, 385 cc and 415 cc.  I will utilize corresponding sizers as well.     \"According to [place] case law and [facility] standards of care, with an appropriate letter of support from a mental health provider, top surgery/mastectomy is medically necessary for the treatment of gender dysphoria.\"     I discussed with Deana the risks of surgery and they include but are not limited to scarring, large scar in the inframammary fold, keloid formation, infection requiring explantation and secondary augmentation at least 6 to 12 months after explantation, bleeding, hematoma, seroma, contour deformities, lack of sensation on the chest and nipple areolar complex, capsular contracture, implant malposition, need for further surgeries.     Patient did acknowledge all of these risks and has agreed to proceed.     We will obtain a letter of support from her therapist and then we will schedule her for surgery: bilateral prepectoral augmentation mammoplasties with possible lowering of bilateral inframammary folds as a soft tissue rearrangement.     Time spent with the patient 45 minutes.      Richard Kim MD , FACS   Diplomate American Board of Plastic Surgery  Diplomate American Board of Surgery  Adj. Assistant Professor of Surgery  Division of Plastic & Reconstructive Surgery   [facility]  Office: [phone]   8/17/2023 at 11:08 AM       Again, thank you for allowing me to participate in the " care of your patient.        Sincerely,        Richard Kim MD     States UBW 225lb  Weight Fluctuates Recently  Previously Lost 75lb over Last Year by Dietitian (Per Patient)

## 2023-08-17 NOTE — PROGRESS NOTES
Chief complaint:  Gender dysphoria, consultation for top surgery     History of present illness:  This is a 23 year old trans female who comes today for consultation regarding top surgery.  Pronouns she/her/hers.  Patient's name is Deana.  At this time the letter of support is pending. Currently she is on estrogen treatment.  She has been receiving estrogen for the last 1 year.  This patient has been experiencing gender dysphoria for the last 20 years.     Past medical history:  Past Medical History:   Diagnosis Date     Anxiety      Bipolar disorder (H)      Depression      Depressive disorder      Uncomplicated asthma        Gender dysphoria     Past surgical history:     Right eye surgery     Allergies:  Macrolides and doxycycline     Medications:    Current Outpatient Medications:      albuterol (PROAIR HFA/PROVENTIL HFA/VENTOLIN HFA) 108 (90 Base) MCG/ACT inhaler, Inhale 2 puffs into the lungs, Disp: , Rfl:      clonazePAM (KLONOPIN) 0.5 MG tablet, Take 0.5 mg by mouth, Disp: , Rfl:      estradiol (ESTRACE) 2 MG tablet, Take 2 mg by mouth, Disp: , Rfl:      lamoTRIgine (LAMICTAL) 25 MG tablet, Take 50 mg by mouth, Disp: , Rfl:      progesterone (PROMETRIUM) 100 MG capsule, Take 200 mg by mouth, Disp: , Rfl:      spironolactone (ALDACTONE) 50 MG tablet, Take 50 mg by mouth, Disp: , Rfl:      traZODone (DESYREL) 100 MG tablet, Take 100 mg by mouth, Disp: , Rfl:      venlafaxine (EFFEXOR XR) 37.5 MG 24 hr capsule, Take 37.5 mg by mouth (Patient not taking: Reported on 4/25/2023), Disp: , Rfl:      Family history:  Noncontributory     Social History:  Denies tobacco, drinks alcohol socially     Review of systems:  General ROS: No complaints or constitutional symptoms  Skin: No complaints or symptoms   Hematologic/Lymphatic: No symptoms or complaints  Psychiatric: Patient presents with gender dysphoria  Endocrine: No excessive fatigue, no hypermetabolic symptoms reported  Respiratory ROS: No cough, shortness of  "breath, or wheezing  Cardiovascular ROS: No chest pain or dyspnea on exertion  Breast ROS: Denies nipple discharge, denies palpable breast masses, denies peau d'orange.  Gastrointestinal ROS: No abdominal pain, nausea, diarrhea, or constipation  Musculoskeletal ROS: No recent injuries reported  Neurological ROS: No focal neurologic defects reported.       Physical exam:     /62   Ht 1.676 m (5' 6\")   Wt 63 kg (139 lb)   BMI 22.44 kg/m    General: Alert, cooperative, appears stated age   Skin: Skin color, texture, turgor normal, no rashes or lesions   Lymphatic: No obvious adenopathy, no swelling   Eyes: No scleral icterus, pupils equal  HENT: No traumatic injury to the head or face, no gross abnormalities  Lungs: Normal respiratory effort, breath sounds equal bilaterally  Heart: Regular rate and rhythm  Breasts:  Bilateral glandular hypertrophy, with grade 1 ptosis.  No nipple inversion, no nipple discharge, no palpable breast masses.    Left nipple areolar complex is slightly higher compared to the right one.  On the right breast sternal notch to nipple distance is 20 cm, nipple to inframammary fold is 6 cm, and breast diameter is 10 cm.  Of the left breast sternal notch to nipple distance is 19 cm, nipple to inframammary fold distance is 7 cm, and breast diameter is 11 cm.  Pinch thickness test on the skin of both breasts is 3.5 cm.  No palpable axillary lymphadenopathies bilaterally.  Abdomen: Soft, non-distended and non-tender to palpation  Neurologic: Grossly intact              Assessment:     23 year old  trans female with history of gender dysphoria.     PLAN:      I believe that Deana is a good candidate for gender affirming top surgery with bilateral prepectoral augmentation mammoplasties (her skin pinch test thickness is greater than 2 cm), via inframammary fold approach, with lowering of the inframammary fold at least 2 cm down from its current position (soft tissue rearrangement).  Furthermore, " "she will need obliteration of the current inframammary fold which is at least 1-2 cm higher from where is supposed to be.  Based upon her breast diameter I will bring to the operating room full 325 profile, cohesive, breast implants with 325 cc, 345 cc, 385 cc and 415 cc.  I will utilize corresponding sizers as well.     \"According to [place] case law and [facility] standards of care, with an appropriate letter of support from a mental health provider, top surgery/mastectomy is medically necessary for the treatment of gender dysphoria.\"     I discussed with Deana the risks of surgery and they include but are not limited to scarring, large scar in the inframammary fold, keloid formation, infection requiring explantation and secondary augmentation at least 6 to 12 months after explantation, bleeding, hematoma, seroma, contour deformities, lack of sensation on the chest and nipple areolar complex, capsular contracture, implant malposition, need for further surgeries.     Patient did acknowledge all of these risks and has agreed to proceed.     We will obtain a letter of support from her therapist and then we will schedule her for surgery: bilateral prepectoral augmentation mammoplasties with possible lowering of bilateral inframammary folds as a soft tissue rearrangement.     Time spent with the patient 45 minutes.      Richard Kim MD , FACS   Diplomate American Board of Plastic Surgery  Diplomate American Board of Surgery  Adj. Assistant Professor of Surgery  Division of Plastic & Reconstructive Surgery   [facility]  Office: [phone]   8/17/2023 at 11:08 AM   "

## 2023-08-17 NOTE — NURSING NOTE
Patient here today for gender affirming breast augmentation consultation.     The patient has two LOS for bottom surgery but will need a new letter for top. Additionally due to insurance plan, the patient may need a DA. I will reach out to the team  to discuss.    Once the patient has all necessary documentation I have advised that uploading via [employer] is the best way to uplaod but if that does not work then faxing is a great alternative.     The patient is accompanied today by mother.     Frances Gonzalez RN on 8/17/2023 at 11:07 AM

## 2023-08-18 ENCOUNTER — DOCUMENTATION ONLY (OUTPATIENT)
Dept: PLASTIC SURGERY | Facility: CLINIC | Age: 23
End: 2023-08-18
Payer: COMMERCIAL

## 2023-08-18 NOTE — PROGRESS NOTES
[facility] :  Care Coordination Note     SITUATION   Sofia J Behnken (she/her) is a 23 year old adult who is receiving support for:  Care Team  .    BACKGROUND     LOS was received. LOS does not meet criteria. Sent message to pt with LOS information to share with their provider. Once updated LOS is submitted, RNCC can place case request for breast augmentation.     ASSESSMENT     Surgery              CGC Assessment  [facility] ([facility] Enrollment: Enrolled  Letter of support #1: Received  Letter #1 Date: 08/17/23  Surgery being considered: Yes  Augmentation: Yes  Vaginoplasty: Yes    PLAN       Nursing Interventions:       Follow-up plan:  Pt will upload updated LOS.       CEDRIC Green

## 2023-09-15 ENCOUNTER — DOCUMENTATION ONLY (OUTPATIENT)
Dept: PLASTIC SURGERY | Facility: CLINIC | Age: 23
End: 2023-09-15
Payer: COMMERCIAL

## 2023-09-15 NOTE — PROGRESS NOTES
[facility] :  Care Coordination Note     SITUATION   Sofia J Behnke (she/her) is a 23 year old adult who is receiving support for:  Care Team  .    BACKGROUND     LOS for breast aug was received. LOS needs updates. Sent message to patient with needed updates.    9/19/23    Updated LOS meets criteria. Updated pt list. Sent message to RNCC and Surgeon to place CR.    ASSESSMENT     Surgery              CGC Assessment  [facility] [place]  Letter of support #1: Received  Letter #1 Date: 09/08/23  Surgery being considered: Yes  Augmentation: Yes  Vaginoplasty: Yes      PLAN          Nursing Interventions:       Follow-up plan:  RNCC and Surgeon will place CR.       CEDRIC Green

## 2023-09-27 ENCOUNTER — TELEPHONE (OUTPATIENT)
Dept: OTOLARYNGOLOGY | Facility: CLINIC | Age: 23
End: 2023-09-27
Payer: COMMERCIAL

## 2023-09-28 ENCOUNTER — OFFICE VISIT (OUTPATIENT)
Dept: OTOLARYNGOLOGY | Facility: CLINIC | Age: 23
End: 2023-09-28
Payer: COMMERCIAL

## 2023-09-28 ENCOUNTER — TELEPHONE (OUTPATIENT)
Dept: PLASTIC SURGERY | Facility: AMBULATORY SURGERY CENTER | Age: 23
End: 2023-09-28

## 2023-09-28 ENCOUNTER — PRE VISIT (OUTPATIENT)
Dept: OTOLARYNGOLOGY | Facility: CLINIC | Age: 23
End: 2023-09-28

## 2023-09-28 VITALS — HEART RATE: 68 BPM | SYSTOLIC BLOOD PRESSURE: 120 MMHG | DIASTOLIC BLOOD PRESSURE: 67 MMHG

## 2023-09-28 DIAGNOSIS — R49.0 DYSPHONIA: ICD-10-CM

## 2023-09-28 DIAGNOSIS — R49.0 DYSPHONIA: Primary | ICD-10-CM

## 2023-09-28 DIAGNOSIS — Z11.3 SCREENING FOR STD (SEXUALLY TRANSMITTED DISEASE): Primary | ICD-10-CM

## 2023-09-28 DIAGNOSIS — M25.532 PAIN IN BOTH WRISTS: ICD-10-CM

## 2023-09-28 DIAGNOSIS — M25.531 PAIN IN BOTH WRISTS: ICD-10-CM

## 2023-09-28 PROCEDURE — 31575 DIAGNOSTIC LARYNGOSCOPY: CPT | Performed by: OTOLARYNGOLOGY

## 2023-09-28 PROCEDURE — 99204 OFFICE O/P NEW MOD 45 MIN: CPT | Mod: 25 | Performed by: OTOLARYNGOLOGY

## 2023-09-28 PROCEDURE — 99207 PR NO CHARGE LOS: CPT

## 2023-09-28 ASSESSMENT — PAIN SCALES - GENERAL: PAINLEVEL: NO PAIN (0)

## 2023-09-28 NOTE — PROGRESS NOTES
[facility]   at the [facility]   Otolaryngology Clinic     Patient: Sofia J Behnken    MRN: [id number]    : 2000    Age/Gender: 23 year old adult  Date of Service: 2023  Rendering Provider:   Maral Krueger MD     Referring Provider   PCP: Dede Arana  Referring Physician: Dain Frederick MD  No address on file  Reason for Consultation   Prominent thyroid cartilage  History   HISTORY OF PRESENT ILLNESS: Ms. Behnken is a 23 year old adult who presents to us today with prominent thyroid cartilage.         she presents today for evaluation. she reports:  History obtained with SLP  From SLP notes  - Sofia J Behnken was seen for brief consultation today.  Please refer to Dr. Krueger s dictation for a more complete history and impressions.  She is interested in pursuing a chondrolaryngoplasty/thyroid notch reduction (aka: tracheal shave) with Dr. Krueger.  She does not have any voice quality concerns in terms of gender/identity-affirming voice care, muscle tension dysphonia, or throat concerns.  However, voice quality today does demonstrate a moderate amount of vocal fernandez today.  We were unable to complete a formal assessment of her voice, as our available spaces were in use around the time of her appointment.     Deana's questions included the details of the procedure, and if we had examples regarding the incision (which we do not have at this time as we are early in providing this type of care, but I encouraged her to be a part of that collection of examples should she choose to pursue care with us).       PAST MEDICAL HISTORY:   Past Medical History:   Diagnosis Date    Anxiety     Bipolar disorder (H)     Depression     Depressive disorder     Uncomplicated asthma        PAST SURGICAL HISTORY:   Past Surgical History:   Procedure Laterality Date    EYE SURGERY      blocked tear duct        CURRENT MEDICATIONS:   Current Outpatient Medications:     albuterol (PROAIR HFA/PROVENTIL  HFA/VENTOLIN HFA) 108 (90 Base) MCG/ACT inhaler, Inhale 2 puffs into the lungs, Disp: , Rfl:     clonazePAM (KLONOPIN) 0.5 MG tablet, Take 0.5 mg by mouth, Disp: , Rfl:     estradiol (ESTRACE) 2 MG tablet, Take 2 mg by mouth, Disp: , Rfl:     lamoTRIgine (LAMICTAL) 25 MG tablet, Take 50 mg by mouth, Disp: , Rfl:     progesterone (PROMETRIUM) 100 MG capsule, Take 200 mg by mouth, Disp: , Rfl:     spironolactone (ALDACTONE) 50 MG tablet, Take 50 mg by mouth, Disp: , Rfl:     traZODone (DESYREL) 100 MG tablet, Take 100 mg by mouth, Disp: , Rfl:     venlafaxine (EFFEXOR XR) 37.5 MG 24 hr capsule, Take 37.5 mg by mouth (Patient not taking: Reported on 4/25/2023), Disp: , Rfl:     ALLERGIES: Doxycycline and Azithromycin    SOCIAL HISTORY:    Social History     Socioeconomic History    Marital status: Single     Spouse name: Not on file    Number of children: Not on file    Years of education: Not on file    Highest education level: Not on file   Occupational History    Not on file   Tobacco Use    Smoking status: Former    Smokeless tobacco: Never    Tobacco comments:     pt uses e cig   Substance and Sexual Activity    Alcohol use: Yes     Comment: Alcoholic Drinks/day:6  drinks per week    Drug use: Yes     Types: Marijuana    Sexual activity: Not Currently   Other Topics Concern    Not on file   Social History Narrative    Not on file     Social Determinants of Health     Financial Resource Strain: Not on file   Food Insecurity: Not on file   Transportation Needs: Not on file   Physical Activity: Not on file   Stress: Not on file   Social Connections: Not on file   Interpersonal Safety: Not on file   Housing Stability: Not on file         FAMILY HISTORY:   Family History   Adopted: Yes     Non-contributory for problems with anesthesia    REVIEW OF SYSTEMS:   The patient was asked a 14 point review of systems regarding constitutional symptoms, eye symptoms, ears, nose, mouth, throat symptoms, cardiovascular symptoms,  respiratory symptoms, gastrointestinal symptoms, genitourinary symptoms, musculoskeletal symptoms, integumentary symptoms, neurological symptoms, psychiatric symptoms, endocrine symptoms, hematologic/lymphatic symptoms, and allergic/ immunologic symptoms.   The pertinent factors have been included in the HPI and below.  Patient Supplied Answers to Review of Systems      9/21/2023     9:25 AM    ENT ROS   Psychology Frequently feeling anxious   Ears, Nose, Throat Nasal congestion or drainage   Gastrointestinal/Genitourinary Unexplained nausea/vomiting   Musculoskeletal Sore or stiff joints   Allergy/Immunology Allergies or hay fever       Physical Examination   The patient underwent a physical examination as described below. The pertinent positive and negative findings are summarized after the description of the examination.  Constitutional: The patient's developmental and nutritional status was assessed. The patient's voice quality was assessed.  Head and Face: The head and face were inspected for deformities. The sinuses were palpated. The salivary glands were palpated. Facial muscle strength was assessed bilaterally.  Eyes: Extraocular movements and primary gaze alignment were assessed.  Ears, Nose, Mouth and Throat: The ears and nose were examined for deformities. The nasal septum, mucosa, and turbinates were inspected by anterior rhinoscopy. The lips, teeth, and gums were examined for abnormalities. The oral mucosa, tongue, palate, tonsils, lateral and posterior pharynx were inspected for the presence of asymmetry or mucosal lesions.    Neck: The tracheal position was noted, and the neck mass palpated to determine if there were any asymmetries, abnormal neck masses, thyromegally, or thyroid nodules.  Respiratory: The nature of the breathing and chest expansion/symmetry was observed.  Cardiovascular: The patient was examined to determine the presence of any edema or jugular venous distension.  Abdomen: The  contour of the abdomen was noted.  Lymphatic: The patient was examined for infraclavicular lymphadenopathy.  Musculoskeletal: The patient was inspected for the presence of skeletal deformities.  Extremities: The extremities were examined for any clubbing or cyanosis.  Skin: The skin was examined for inflammatory or neoplastic conditions.  Neurologic: The patient's orientation, mood, and affect were noted. The cranial nerve  functions were examined.  Other pertinent positive and negative findings on physical examination:      OC/OP: no lesions, uvula midline, soft palate elevates symmetrically   Neck: no lesions, no TH tenderness to palpation  Prominent thyroid cartilage in extension  All other physical examination findings were within normal limits and noncontributory.  Procedures   Flexible laryngoscopy (CPT 30509)      Pre-procedure diagnosis: dysphonia  Post-procedure diagnosis: same as above  Indication for procedure: Ms. Behnken is a 23 year old adult with see above  Procedure(s): Fiberoptic Laryngoscopy    Details of Procedure: After informed consent was obtained, the patient was seated in the examination chair.  The areas of the nasopharynx as well as the hypopharynx were anesthetized with topical 4% lidocaine with 0.25% phenylephrine atomizer.  Examination of the base of tongue was performed first.  Attention was directed to any evidence of masses in the area or evidence of leukoplakia or candidal infection.  Attention was directed to the epiglottis where its size and position was determined and its movement on phonation of the vowel  e .  The piriform sinuses were then inspected for any mass lesions or pooling of secretions.  Attention was then directed to the larynx. The vocal folds were inspected for infection or any areas of leukoplakia, for masses, polypoid degeneration, or hemorrhage.  Having done this, the arytenoids and vocal processes were inspected for erythema or evidence of granuloma formation.   The posterior commissure was then inspected for evidence of inflammatory changes in the mucosa and heaping up of mucosal tissue. The patient was then instructed to say the vowel  e .  Adduction of vocal folds to the midline was observed for any evidence of paresis or paralysis of the larynx or asymmetry in rotation of the larynx to the left or right. The patient was asked to breathe and the degree of abduction was noted bilaterally.  Subglottic view of the larynx was obtained for any additional mass lesions or mucosal changes.  Finally the post cricoid was examined for evidence of pooling of secretions, as well as the pharyngeal wall mucosa.   Anesthesia type: 0.25% phenylephrine    Findings:  Anatomic/physiological deviations: mild postcricoid edema   Right vocal process: No restriction of mobility   Left vocal process: No restriction of mobility  Glottal gap: Complete glottal closure  Supraglottic structures: Normal  Hypopharynx: Normal     Estimated Blood Loss: minimal  Complications: None  Disposition: Patient tolerated the procedure well            Review of Relevant Clinical Data   I personally reviewed:     Labs:  No results found for: TSH  No results found for: NA, CO2, BUN, CREAT, GLUCOSE, PHOS  No results found for: WBC, HGB, HCT, MCV, PLT  No results found for: PT, PTT, INR  No results found for: JUSTIN  No components found for: RHEUMATOIDFACTOR,  RF  No results found for: CRP  No components found for: CKTOT, URICACID  No components found for: C3, C4, DSDNAAB, NDNAABIFA  No results found for: MPOAB    Patient reported Quality of Life (QOL) Measures   Patient Supplied Answers To VHI Questionnaire      9/21/2023     9:31 AM   Voice Handicap Index (VHI-10)   My voice makes it difficult for people to hear me 1   People have difficulty understanding me in a noisy room 2   My voice difficulties restrict my personal and social life.  1   I feel left out of conversations because of my voice 1   My voice problem  "causes me to lose income 0   I feel as though I have to strain to produce voice 1   The clarity of my voice is unpredictable 2   My voice problem upsets me 1   My voice makes me feel handicapped 0   People ask, \"What's wrong with your voice?\" 1   VHI-10 10         Patient Supplied Answers To EAT Questionnaire      9/21/2023     9:31 AM   Eating Assessment Tool (EAT-10)   My swallowing problem has caused me to lose weight 0   My swallowing problem interferes with my ability to go out for meals 0   Swallowing liquids takes extra effort 0   Swallowing solids takes extra effort 0   Swallowing pills takes extra effort 0   Swallowing is painful 0   The pleasure of eating is affected by my swallowing 0   When I swallow food sticks in my throat 0   I cough when I eat 0   Swallowing is stressful 0   EAT-10 0         Patient Supplied Answers To CSI Questionnaire      9/21/2023     9:32 AM   Cough Severity Index (CSI)   My cough is worse when I lie down 0   My coughing problem causes me to restrict my personal and social life 0   I tend to avoid places because of my cough problem 0   I feel embarrassed because of my coughing problem 0   People ask, ''What's wrong?'' because I cough a lot 0   I run out of air when I cough 0   My coughing problem affects my voice 0   My coughing problem limits my physical activity 0   My coughing problem upsets me 0   People ask me if I am sick because I cough a lot 0   CSI Score 0         Patient Supplied Answers to Dyspnea Index Questionnaire:       No data to display                Impression & Plan     IMPRESSION: Ms. Behnken is a 23 year old adult who is being seen for the following:    Prominent thyroid notch  - trans male to female  - on exam with pronounced thyroid notch on extension  - does have hypertrophic scarring  - discussed risks and benefits to include and not be limited to persistent prominent notch, thyroid lamina loss of stability, difficulty swallowing, difficulty with " voicing and persistent scarring  - patient will think about this and decide after her top surgery  - will also add kenalog injections at the time of surgery + afterwards given hypertrophic scarring  Plan  - will call with decision       RETURN VISIT: as needed    Thank you for the kind referral and for allowing me to share in the care of Ms. Behnken. If you have any questions, please do not hesitate to contact me.    Maral Krueger MD    Laryngology    [facility]  Department of  Otolaryngology - Head and Neck Surgery  [facility] [place] [address]  Appointment line: [phone]  Fax: [phone]  [email or web address]

## 2023-09-28 NOTE — PATIENT INSTRUCTIONS
Minoo Reynolds M.M. (voice), M.A., CCC-SLP  Speech-Language Pathologist  SVI Trained Vocologist  [facility]  she/her  [email or web address]  Ruchi is best communication

## 2023-09-28 NOTE — TELEPHONE ENCOUNTER
Spoke with patient today regarding surgery scheduling     Went over details/instructions.    Surgery Letter sent via [employer]  (Please see LETTERS TAB in chart to retrieve a copy of this letter)

## 2023-09-28 NOTE — LETTER
Pre-op Physical:  Schedule a pre-op physical with your primary care doctor if not internal to [facility].  If internal, we have scheduled this.   The pre-op physical must be 10-30 days before surgery and since it is required by anesthesia, your surgery will be cancelled if it's not done.      Surgery Date: 12/6/2023     [facility]    [address], [address], [place]  [address]    Approximate Arrival Time: 9:00 am  (Unless instructed differently by the pre-op call nurse)     Post op Appointment: 12/8/2023 at 11:45 am with  Dr. Kim  [facility],   [facility], [place] [address].    Pre-Surgical Tasks:     Review all medications with your primary care or prescribing physician; they will advise you which meds to stop and when, and when you can resume taking.  Certain medications like blood thinners and weight loss medications need to be stopped in advance of surgery to proceed safely.      Blood thinners including but not exclusive to drugs like Xarelto, Eliquis, Warfarin and Aspirin, should be stopped five days before surgery, if your prescribing provider agrees. Follow your provider's advice on stopping blood thinners because they know you best.  If you are unsure if your medication is a blood thinner, ask your prescribing provider.    Weight loss medications: There are multiple medications being used for weight management and diabetes today, and the list is growing.  Phentermine, Ozempic, Wegovy, Trulicity, and other similar medications need to be stopped one week before surgery to avoid being cancelled.  Victoza and Saxenda can be continued longer but must be stopped one full day before surgery.  Please ask your prescribing provider for advice.    Diabetic medications: in addition to the medications talked about above that are used for either weight loss or diabetes, some  people are on insulin that may require adjustment.  Please discuss managing diabetic medications with your prescribing doctor as these medications may require modification prior to surgery.     Please shower the evening before and morning of surgery with Hibiclens soap.  Any [facility] can provide this to you at no cost, or it can be found at your local pharmacy.     Fasting instructions will be provided by the pre-op nurse who will call you 1-3 days before surgery.  Typically, we advise normal food up to 8 hours before you arrive for surgery. Clear liquids only from then until 2 hours before you arrive surgery, then nothing at all by mouth.  The nurse will review your specific instructions with you at the call.      Smoking impacts your body's ability to heal properly so we advise patients to quit if possible before surgery.  Plastic Surgery patients are required to be nicotine free for at least 8 weeks before surgery.      You will need an adult to drive you home and stay with you 24 hours after surgery. Public transportation or Medical Van Services are not permitted.    Visitor restrictions are subject to change, please verify with the pre-op nurse when they call how many people are permitted to accompany you.    We always encourage you to notify your insurance any time you have medical tests or procedures scheduled including surgery. The number is usually right on the back of your insurance card. To obtain pricing for surgery, please call [facility] Cost of Care at [phone] or email [email or web address].        Call our office if you have any questions! Thank you!

## 2023-09-28 NOTE — PATIENT INSTRUCTIONS
1.  You were seen in the ENT Clinic today by Dr. Krueger. If you have any questions or concerns after your appointment, please call [phone]. Press option #1 for scheduling related needs. Press option #3 for Nurse advice.    2.  Plan is to return to clinic Reach out with any questions or concerns    Susan Mcgee  Please call [phone] with any questions or concerns  [facility] - Otolaryngology

## 2023-09-28 NOTE — LETTER
2023       RE: Sofia J Behnken  [address] [facility] [address]     Dear Colleague,    Thank you for referring your patient, Sofia J Behnken, to the [facility] [place] at [facility]. Please see a copy of my visit note below.        [facility]   at the [facility]   Otolaryngology Clinic     Patient: Sofia J Behnken    MRN: [id number]    : 2000    Age/Gender: 23 year old adult  Date of Service: 2023  Rendering Provider:   Maral Krueger MD     Referring Provider   PCP: Dede Arana  Referring Physician: Dain Frederick MD  No address on file  Reason for Consultation   Prominent thyroid cartilage  History   HISTORY OF PRESENT ILLNESS: Ms. Behnken is a 23 year old adult who presents to us today with prominent thyroid cartilage.         she presents today for evaluation. she reports:  History obtained with SLP  From SLP notes  - Sofia J Behnken was seen for brief consultation today.  Please refer to Dr. Krueger s dictation for a more complete history and impressions.  She is interested in pursuing a chondrolaryngoplasty/thyroid notch reduction (aka: tracheal shave) with Dr. Krueger.  She does not have any voice quality concerns in terms of gender/identity-affirming voice care, muscle tension dysphonia, or throat concerns.  However, voice quality today does demonstrate a moderate amount of vocal fernandez today.  We were unable to complete a formal assessment of her voice, as our available spaces were in use around the time of her appointment.     Deana's questions included the details of the procedure, and if we had examples regarding the incision (which we do not have at this time as we are early in providing this type of care, but I encouraged her to be a part of that collection of examples should she choose to pursue care with us).       PAST MEDICAL HISTORY:   Past Medical History:   Diagnosis  Date    Anxiety     Bipolar disorder (H)     Depression     Depressive disorder     Uncomplicated asthma        PAST SURGICAL HISTORY:   Past Surgical History:   Procedure Laterality Date    EYE SURGERY      blocked tear duct        CURRENT MEDICATIONS:   Current Outpatient Medications:     albuterol (PROAIR HFA/PROVENTIL HFA/VENTOLIN HFA) 108 (90 Base) MCG/ACT inhaler, Inhale 2 puffs into the lungs, Disp: , Rfl:     clonazePAM (KLONOPIN) 0.5 MG tablet, Take 0.5 mg by mouth, Disp: , Rfl:     estradiol (ESTRACE) 2 MG tablet, Take 2 mg by mouth, Disp: , Rfl:     lamoTRIgine (LAMICTAL) 25 MG tablet, Take 50 mg by mouth, Disp: , Rfl:     progesterone (PROMETRIUM) 100 MG capsule, Take 200 mg by mouth, Disp: , Rfl:     spironolactone (ALDACTONE) 50 MG tablet, Take 50 mg by mouth, Disp: , Rfl:     traZODone (DESYREL) 100 MG tablet, Take 100 mg by mouth, Disp: , Rfl:     venlafaxine (EFFEXOR XR) 37.5 MG 24 hr capsule, Take 37.5 mg by mouth (Patient not taking: Reported on 4/25/2023), Disp: , Rfl:     ALLERGIES: Doxycycline and Azithromycin    SOCIAL HISTORY:    Social History     Socioeconomic History    Marital status: Single     Spouse name: Not on file    Number of children: Not on file    Years of education: Not on file    Highest education level: Not on file   Occupational History    Not on file   Tobacco Use    Smoking status: Former    Smokeless tobacco: Never    Tobacco comments:     pt uses e cig   Substance and Sexual Activity    Alcohol use: Yes     Comment: Alcoholic Drinks/day:6  drinks per week    Drug use: Yes     Types: Marijuana    Sexual activity: Not Currently   Other Topics Concern    Not on file   Social History Narrative    Not on file     Social Determinants of Health     Financial Resource Strain: Not on file   Food Insecurity: Not on file   Transportation Needs: Not on file   Physical Activity: Not on file   Stress: Not on file   Social Connections: Not on file   Interpersonal Safety: Not on file    Housing Stability: Not on file         FAMILY HISTORY:   Family History   Adopted: Yes     Non-contributory for problems with anesthesia    REVIEW OF SYSTEMS:   The patient was asked a 14 point review of systems regarding constitutional symptoms, eye symptoms, ears, nose, mouth, throat symptoms, cardiovascular symptoms, respiratory symptoms, gastrointestinal symptoms, genitourinary symptoms, musculoskeletal symptoms, integumentary symptoms, neurological symptoms, psychiatric symptoms, endocrine symptoms, hematologic/lymphatic symptoms, and allergic/ immunologic symptoms.   The pertinent factors have been included in the HPI and below.  Patient Supplied Answers to Review of Systems      9/21/2023     9:25 AM    ENT ROS   Psychology Frequently feeling anxious   Ears, Nose, Throat Nasal congestion or drainage   Gastrointestinal/Genitourinary Unexplained nausea/vomiting   Musculoskeletal Sore or stiff joints   Allergy/Immunology Allergies or hay fever       Physical Examination   The patient underwent a physical examination as described below. The pertinent positive and negative findings are summarized after the description of the examination.  Constitutional: The patient's developmental and nutritional status was assessed. The patient's voice quality was assessed.  Head and Face: The head and face were inspected for deformities. The sinuses were palpated. The salivary glands were palpated. Facial muscle strength was assessed bilaterally.  Eyes: Extraocular movements and primary gaze alignment were assessed.  Ears, Nose, Mouth and Throat: The ears and nose were examined for deformities. The nasal septum, mucosa, and turbinates were inspected by anterior rhinoscopy. The lips, teeth, and gums were examined for abnormalities. The oral mucosa, tongue, palate, tonsils, lateral and posterior pharynx were inspected for the presence of asymmetry or mucosal lesions.    Neck: The tracheal position was noted, and the neck mass  palpated to determine if there were any asymmetries, abnormal neck masses, thyromegally, or thyroid nodules.  Respiratory: The nature of the breathing and chest expansion/symmetry was observed.  Cardiovascular: The patient was examined to determine the presence of any edema or jugular venous distension.  Abdomen: The contour of the abdomen was noted.  Lymphatic: The patient was examined for infraclavicular lymphadenopathy.  Musculoskeletal: The patient was inspected for the presence of skeletal deformities.  Extremities: The extremities were examined for any clubbing or cyanosis.  Skin: The skin was examined for inflammatory or neoplastic conditions.  Neurologic: The patient's orientation, mood, and affect were noted. The cranial nerve  functions were examined.  Other pertinent positive and negative findings on physical examination:      OC/OP: no lesions, uvula midline, soft palate elevates symmetrically   Neck: no lesions, no TH tenderness to palpation  Prominent thyroid cartilage in extension  All other physical examination findings were within normal limits and noncontributory.  Procedures   Flexible laryngoscopy (CPT 97617)      Pre-procedure diagnosis: dysphonia  Post-procedure diagnosis: same as above  Indication for procedure: Ms. Behnken is a 23 year old adult with see above  Procedure(s): Fiberoptic Laryngoscopy    Details of Procedure: After informed consent was obtained, the patient was seated in the examination chair.  The areas of the nasopharynx as well as the hypopharynx were anesthetized with topical 4% lidocaine with 0.25% phenylephrine atomizer.  Examination of the base of tongue was performed first.  Attention was directed to any evidence of masses in the area or evidence of leukoplakia or candidal infection.  Attention was directed to the epiglottis where its size and position was determined and its movement on phonation of the vowel  e .  The piriform sinuses were then inspected for any mass  lesions or pooling of secretions.  Attention was then directed to the larynx. The vocal folds were inspected for infection or any areas of leukoplakia, for masses, polypoid degeneration, or hemorrhage.  Having done this, the arytenoids and vocal processes were inspected for erythema or evidence of granuloma formation.  The posterior commissure was then inspected for evidence of inflammatory changes in the mucosa and heaping up of mucosal tissue. The patient was then instructed to say the vowel  e .  Adduction of vocal folds to the midline was observed for any evidence of paresis or paralysis of the larynx or asymmetry in rotation of the larynx to the left or right. The patient was asked to breathe and the degree of abduction was noted bilaterally.  Subglottic view of the larynx was obtained for any additional mass lesions or mucosal changes.  Finally the post cricoid was examined for evidence of pooling of secretions, as well as the pharyngeal wall mucosa.   Anesthesia type: 0.25% phenylephrine    Findings:  Anatomic/physiological deviations: mild postcricoid edema   Right vocal process: No restriction of mobility   Left vocal process: No restriction of mobility  Glottal gap: Complete glottal closure  Supraglottic structures: Normal  Hypopharynx: Normal     Estimated Blood Loss: minimal  Complications: None  Disposition: Patient tolerated the procedure well            Review of Relevant Clinical Data   I personally reviewed:     Labs:  No results found for: TSH  No results found for: NA, CO2, BUN, CREAT, GLUCOSE, PHOS  No results found for: WBC, HGB, HCT, MCV, PLT  No results found for: PT, PTT, INR  No results found for: JUSTIN  No components found for: RHEUMATOIDFACTOR,  RF  No results found for: CRP  No components found for: CKTOT, URICACID  No components found for: C3, C4, DSDNAAB, NDNAABIFA  No results found for: MPOAB    Patient reported Quality of Life (QOL) Measures   Patient Supplied Answers To I  "Questionnaire      9/21/2023     9:31 AM   Voice Handicap Index (VHI-10)   My voice makes it difficult for people to hear me 1   People have difficulty understanding me in a noisy room 2   My voice difficulties restrict my personal and social life.  1   I feel left out of conversations because of my voice 1   My voice problem causes me to lose income 0   I feel as though I have to strain to produce voice 1   The clarity of my voice is unpredictable 2   My voice problem upsets me 1   My voice makes me feel handicapped 0   People ask, \"What's wrong with your voice?\" 1   VHI-10 10         Patient Supplied Answers To EAT Questionnaire      9/21/2023     9:31 AM   Eating Assessment Tool (EAT-10)   My swallowing problem has caused me to lose weight 0   My swallowing problem interferes with my ability to go out for meals 0   Swallowing liquids takes extra effort 0   Swallowing solids takes extra effort 0   Swallowing pills takes extra effort 0   Swallowing is painful 0   The pleasure of eating is affected by my swallowing 0   When I swallow food sticks in my throat 0   I cough when I eat 0   Swallowing is stressful 0   EAT-10 0         Patient Supplied Answers To CSI Questionnaire      9/21/2023     9:32 AM   Cough Severity Index (CSI)   My cough is worse when I lie down 0   My coughing problem causes me to restrict my personal and social life 0   I tend to avoid places because of my cough problem 0   I feel embarrassed because of my coughing problem 0   People ask, ''What's wrong?'' because I cough a lot 0   I run out of air when I cough 0   My coughing problem affects my voice 0   My coughing problem limits my physical activity 0   My coughing problem upsets me 0   People ask me if I am sick because I cough a lot 0   CSI Score 0         Patient Supplied Answers to Dyspnea Index Questionnaire:       No data to display                Impression & Plan     IMPRESSION: Ms. Behnken is a 23 year old adult who is being seen for " the following:    Prominent thyroid notch  - trans male to female  - on exam with pronounced thyroid notch on extension  - does have hypertrophic scarring  - discussed risks and benefits to include and not be limited to persistent prominent notch, thyroid lamina loss of stability, difficulty swallowing, difficulty with voicing and persistent scarring  - patient will think about this and decide after her top surgery  - will also add kenalog injections at the time of surgery + afterwards given hypertrophic scarring  Plan  - will call with decision       RETURN VISIT: as needed    Thank you for the kind referral and for allowing me to share in the care of Ms. Behnken. If you have any questions, please do not hesitate to contact me.    Maral Krueger MD    Laryngology    [facility]  Department of  Otolaryngology - Head and Neck Surgery  [facility]  [address] [place] [address]  Appointment line: [phone]  Fax: [phone]  [email or web address]-[email or web address]

## 2023-09-28 NOTE — PROGRESS NOTES
[facility] CLINIC    Clinician: Minoo Reynolds M.M. (voice), M.A., CCC-SLP  Seen in conjunction with: Dr. Krueger  Patient: Behnken, Sofia  Date of Visit: 9/28/2023    HISTORY  PATIENT INFORMATION  Sofia J Behnken was seen for brief consultation today.  Please refer to Dr. Krueger s dictation for a more complete history and impressions.  She is interested in pursuing a chondrolaryngoplasty/thyroid notch reduction (aka: tracheal shave) with Dr. Krueger.  She does not have any voice quality concerns in terms of gender/identity-affirming voice care, muscle tension dysphonia, or throat concerns.  However, voice quality today does demonstrate a moderate amount of vocal fernandez today.  We were unable to complete a formal assessment of her voice, as our available spaces were in use around the time of her appointment.    Deana's questions included the details of the procedure, and if we had examples regarding the incision (which we do not have at this time as we are early in providing this type of care, but I encouraged her to be a part of that collection of examples should she choose to pursue care with us).    Dr. Krueger noted that she had some visible scar from the past, and that this procedure would likely result in a scar under the chin.  They discussed that Deana wait until after her top augmentation in order to assess how significantly she scars.     Laryngeal exam demonstrated an essentially healthy, normal functioning larynx.     I encouraged her to reach out to me if she had any additional questions or concerns as a facilitator for our gender-affirming care program.     DIAGNOSIS/REASON FOR REFERRAL  Dysphonia/ Evaluate, perform laryngeal exam, treat as appropriate    No charge for today s session; charges will be billed at the completion of the evaluation  NO CHARGE FACILITY FEE (64918)    ICD-10 code (s): R49.0 (Dysphonia)    Time: 20 min    Minoo Reynolds M.M. (voice), M.A., CCC/SLP  Speech-Language Pathologist  [facility]  [facility]  [phone]

## 2023-10-14 ENCOUNTER — HEALTH MAINTENANCE LETTER (OUTPATIENT)
Age: 23
End: 2023-10-14

## 2023-11-13 NOTE — H&P (VIEW-ONLY)
Nursing Notes:   Lara Denney  11/22/2023 11:46 AM  Sign at exiting of workspace  Chief Complaint   Patient presents with     Pre-Op Exam       Additional visit information (chief complaint/health maintenance) shared by patient: preop     Health Maintenance Due   Topic Date Due     COVID-19 vaccine series (1) Never done     Pneumococcal series for age 6-64 (1 - PCV) 06/28/2005     Pap test for age 21-65  Never done     Influenza for age 9-49  09/01/2023       Health maintenance reviewed with patient Yes    Patient presents for an in-person office visit: alone  Communication Method: Patient is active on [employer] and has been instructed that results/communications will be made via [employer]  If a phone call is needed, the preferred number is:  Mobile   Home Phone [phone]   Mobile [phone]     May we leave a detailed message at this number? Yes  Lara Denney CMA  ....................  11/22/2023   11:42 AM   Sofia Behnken is a 23 y.o. adult (2000) who presents for AUGMENTATION, BREAST, BILATERAL     Date of Surgery: 12/06/2024  Surgical Specialty: Richard Kim MD   Hospital/Surgical Facility: [facility]   Fax number: [phone]  Surgery type: outpatient  Primary Physician: Dede Arana CMA  ....................  11/22/2023   11:44 AM     PREOPERATIVE CLEARANCE  Date of Exam: 11/22/23    Sofia Behnken is a 23 y.o. adult (2000) who presents for pre-operative evaluation.    Date of Surgery:  12/6/2023  Type of Surgery:  Top surgery/breast augmentation  Surgical Specialty: Plastic  Dr. Richard Kim  Hospital/Surgical Facility: [facility]   Surgery type: outpatient  Primary Physician: Dede Arana     HPI:  Patient is having breast surgery as part of her treatment for gender dysphoria.    Histories:  Past Medical History:   . Date     Anxiety disorder     Managed by Psych     Bipolar II disorder (HC)      Managed by Psych     Depression     Managed by Psych     Gender dysphoria      Mild intermittent asthma without complication     Usually related to allergies         Past Surgical History:   . Laterality Date     TEAR DUCT SURGERY       WISDOM TEETH EXTRACTION  2021       Anesthesia Complications: None  History of abnormal bleeding : None  History of Blood Transfusions: No        * No data to display                 Social History     Socioeconomic History     Marital status: Single     Number of children: 0   Occupational History     Occupation: Delivery     Employer: [employer]   Tobacco Use     Smoking status: Never     Smokeless tobacco: Never   Vaping Use     Vaping Use: Former     Quit date: 10/19/2022   Substance and Sexual Activity     Alcohol use: Not Currently     Alcohol/week: 4.0 standard drinks of alcohol     Types: 4 Standard drinks or equivalent per week     Drug use: Yes     Frequency: 7.0 times per week     Types: Marijuana     Sexual activity: Yes     Partners: Male         Family History   Adopted: Yes       Allergies   Allergen Reactions     Doxycycline GI Upset     Nausea and vomiting and diarrhea after starting medication     Azithromycin Hives     Azithromycin Rash     Latex Allergy: no    Current Medications:  Current Outpatient Rx   Medication Sig Dispense Refill     albuterol HFA (PRO-AIR; VENTOLIN; PROVENTIL) 90 mcg/actuation inhaler INHALE 2 PUFFS BY MOUTH EVERY 6 HOURS IF NEEDED FOR SHORTNESS OF BREATH OR WHEEZING. 8.5 Each 1     clonazePAM (KLONOPIN) 0.5 mg tablet Take 1-2 Tablets (0.5-1 mg) by mouth once daily if needed for Anxiety (panic). 20 Tablet 1     estradiol valerate (DELESTROGEN) 10 mg/mL intramuscular oil INJECT 0.5ML (5MG) SUBCUTANEOUSLY ONCE WEEKLY. 5 mL 3     lamoTRIgine (LAMICTAL) 25 mg tablet Take 2 Tablets (50 mg) by mouth once daily. 180 Tablet 0     loratadine (CLARITIN) 10 mg tablet Take 1 Tablet (10 mg) by mouth two times daily.  0     Needle, Disp, 18 G 18  "gauge x 1\" ndle As directed. To draw up estradiol 100 Each 2     progesterone micronized (PROMETRIUM) 100 mg capsule Take 2 Capsules (200 mg) by mouth at bedtime. This product contains peanut oil. Please verify patient allergies. 200 Capsule 0     Safety Needles (SafetyGlide Needle) 25 gauge x 5/8\" ndle As directed. Use to inject estradiol subcutaneously 100 Each 2     spironolactone (ALDACTONE) 50 mg tablet Take 1 Tablet (50 mg) by mouth two times daily. 200 Tablet 0     Syringe, Disposable, 1 mL syrg As directed. Use to inject estradiol 100 Each 2     traZODone (DESYREL) 100 mg tablet Take 1.5-2 Tablets (150-200 mg) by mouth at bedtime. 180 Tablet 0     Medications have been reviewed by me and are current to the best of my knowledge and ability.     Recent use of: no recent use of aspirin (ASA), NSAIDS or steroids    Health Care Maintenance:    Immunization History   Administered Date(s) Administered     DTaP 10/30/2000, 06/14/2004     Dtap Unspecified Formulation 2000, 2000, 2000, 10/30/2000, 06/14/2004     HIB-HepB (Comvax) 10/30/2000     HPV 2(Cervarix) 10/13/2014, 01/29/2015, 05/14/2015     Hepatitis A (Peds) 10/13/2014, 05/14/2015     Hepatitis A, Unspecified 10/13/2014, 05/14/2015     Hepatitis B, Unspecified 1999, 03/29/2000, 10/30/2000     Human Papilloma Virus Vaccine 10/13/2014, 01/29/2015, 05/14/2015     Inactivated Polio Vaccine 1999, 1999, 03/29/2000, 06/14/2004     Influenza, IIV3 (Age 6-35 mos) 11/18/2011     Influenza, IIV4 02/10/2014, 10/13/2014     MMR 06/28/2000, 06/14/2004     Meningococcal Mcv4, Unspecified Formulation 05/17/2011     Meningococcal Vaccine (Menactra) 05/17/2011     Pneumococcal conj 7-Valent (Prevnar 7) 06/28/2000, 10/30/2000     Rubella 06/28/2000     Tdap 05/17/2011, 10/19/2022     Varicella Vaccine 06/28/2000, 06/26/2008     Tetanus up to date yes    Health Care Directive or Living Will: no    REVIEW OF SYSTEMS:  General: denies any " general problems.  Eyes: denies problems  Ears/Nose/Throat: denies problems  Cardiovascular: denies problems  Respiratory: denies problems  Gastrointestinal: denies problems  Genitourinary: denies problems  Musculoskeletal: denies problems  Skin: denies problems  Neurologic: denies problems  Psychiatric: denies problems  Endocrine: denies problems  Heme/Lymphatic: denies problems  Allergic/Immunologic: denies problems    No LMP recorded.    Deana reports that Sofia Behnken is able to perform activities with >4 METS.     EXAM:   Vitals:    11/22/23 1144   BP: 98/62   Cuff Site: Right Arm   Position: Sitting   Cuff Size: Adult Regular   Pulse: 84   Resp: 18   SpO2: 96%   Weight: 63.1 kg (139 lb 3.2 oz)   Body mass index is 22.13 kg/m .    General Appearance: Pleasant, alert, appropriate appearance for age. No acute distress  Chest/Respiratory Exam: Normal chest wall and respirations. Clear to auscultation.  Cardiovascular Exam: Regular rate and rhythm. S1, S2, no murmur, click, gallop, or rubs.    DIAGNOSTICS:   1. EKG: EKG FINDINGS - not indicated  2. Labs: none indicated  3.  Pre-op urine for pregnancy (for 12 yrs and older or menstruating): not applicable      The Pre-Op Tool    Recommendations      Low Risk Procedure    Cardiac History  No history of coronary artery disease           Labs  No routine labs indicated  EKG  Not indicated  Stress Testing  Not indicated    * Testing recommendations are intended to assist, but not direct, clinical decisions.            Labs  * Data supports elimination of  routine  laboratory testing in favor of focused,  indicated  testing based on medical co-morbidities. A 2009 study randomized 1061 patients undergoing ambulatory, non-cataract surgery to routine or to indicated testing. Perioperative adverse events were similar (Anesthesia & Analgesia 2009;108:467-75; Anesthesiol. Clin. 2016 Mar;34(1):43-58).  EKG  * The ACC/AHA recommends against obtaining routine EKGs in patients  undergoing low risk surgeries, a class IIa recommendation ([facility]. 2014;64(21);e1-76).     Session ID: [id number]  Endnotes and bibliography available upon request: [email or web address]    ASSESSMENT/PLAN: Sofia Behnken is a 23 y.o. adult (2000) here for pre-operative evaluation for the above mentioned surgery.    1)  Pre-operative Assessment: Patient presents today for a pre-operative evaluation prior to a minor surgery.  Sofia Behnken does not have identified risk factors.  Yogesh Criteria Revised Cardiac Risk Index puts patient in the very low risk category.  For above listed surgery and anesthesia, patient is low risk for perioperative complications.  Patient is medically optimized for the above mentioned surgery at any surgical center, including ambulatory surgical centers.  Recommendations include proceed without further diagnostic evaluation, discontinue aspirin (ASA) 7 days prior to reduce bleeding risk, discontinue NSAIDS 7 days prior to procedure to reduce bleeding risk, use tylenol 1 gram every 6 hours as needed for pain prior to procedure, and resume all medications post-operative or per surgeon    Questions answered.  Patient understands and agrees with plan of care.    Total time preparing to see this patient, face-to-face time, and coordinating care time on the same calendar date: 13 minutes.     Electronically Signed by: Dede Arana MD ....................  11/22/2023    11:51 AM    *Some images could not be shown.

## 2023-11-28 RX ORDER — CEPHALEXIN 500 MG/1
500 CAPSULE ORAL 3 TIMES DAILY
Qty: 21 CAPSULE | Refills: 0 | Status: SHIPPED | OUTPATIENT
Start: 2023-11-28 | End: 2023-11-29

## 2023-11-28 RX ORDER — HYDROCODONE BITARTRATE AND ACETAMINOPHEN 5; 325 MG/1; MG/1
1 TABLET ORAL EVERY 4 HOURS PRN
Qty: 20 TABLET | Refills: 0 | Status: SHIPPED | OUTPATIENT
Start: 2023-11-28 | End: 2023-11-29

## 2023-11-28 RX ORDER — MUPIROCIN 20 MG/G
OINTMENT TOPICAL 3 TIMES DAILY
Qty: 30 G | Refills: 0 | Status: SHIPPED | OUTPATIENT
Start: 2023-11-28 | End: 2023-11-29

## 2023-11-28 RX ORDER — ONDANSETRON 4 MG/1
4 TABLET, FILM COATED ORAL EVERY 6 HOURS PRN
Qty: 16 TABLET | Refills: 0 | Status: SHIPPED | OUTPATIENT
Start: 2023-11-28 | End: 2023-11-29

## 2023-11-28 RX ORDER — DOCUSATE SODIUM 100 MG/1
100 CAPSULE, LIQUID FILLED ORAL 2 TIMES DAILY
Qty: 20 CAPSULE | Refills: 0 | Status: SHIPPED | OUTPATIENT
Start: 2023-11-28 | End: 2023-11-29

## 2023-11-29 RX ORDER — CEPHALEXIN 500 MG/1
500 CAPSULE ORAL 3 TIMES DAILY
Qty: 21 CAPSULE | Refills: 0 | Status: SHIPPED | OUTPATIENT
Start: 2023-11-29 | End: 2023-11-29

## 2023-11-29 RX ORDER — MUPIROCIN 20 MG/G
OINTMENT TOPICAL 3 TIMES DAILY
Qty: 30 G | Refills: 0 | Status: SHIPPED | OUTPATIENT
Start: 2023-11-29 | End: 2023-11-29

## 2023-11-29 RX ORDER — CEPHALEXIN 500 MG/1
500 CAPSULE ORAL 3 TIMES DAILY
Qty: 21 CAPSULE | Refills: 0 | Status: SHIPPED | OUTPATIENT
Start: 2023-11-29

## 2023-11-29 RX ORDER — DOCUSATE SODIUM 100 MG/1
100 CAPSULE, LIQUID FILLED ORAL 2 TIMES DAILY
Qty: 20 CAPSULE | Refills: 0 | Status: SHIPPED | OUTPATIENT
Start: 2023-11-29 | End: 2023-11-29

## 2023-11-29 RX ORDER — OXYCODONE AND ACETAMINOPHEN 5; 325 MG/1; MG/1
1 TABLET ORAL EVERY 6 HOURS PRN
Qty: 18 TABLET | Refills: 0 | Status: SHIPPED | OUTPATIENT
Start: 2023-11-29 | End: 2023-11-29

## 2023-11-29 RX ORDER — ONDANSETRON 4 MG/1
4 TABLET, FILM COATED ORAL EVERY 6 HOURS PRN
Qty: 16 TABLET | Refills: 0 | Status: SHIPPED | OUTPATIENT
Start: 2023-11-29

## 2023-11-29 RX ORDER — MUPIROCIN 20 MG/G
OINTMENT TOPICAL 3 TIMES DAILY
Qty: 30 G | Refills: 0 | Status: SHIPPED | OUTPATIENT
Start: 2023-11-29 | End: 2023-12-06

## 2023-11-29 RX ORDER — DOCUSATE SODIUM 100 MG/1
100 CAPSULE, LIQUID FILLED ORAL 2 TIMES DAILY
Qty: 20 CAPSULE | Refills: 0 | Status: SHIPPED | OUTPATIENT
Start: 2023-11-29

## 2023-11-29 RX ORDER — OXYCODONE AND ACETAMINOPHEN 5; 325 MG/1; MG/1
1 TABLET ORAL EVERY 6 HOURS PRN
Qty: 18 TABLET | Refills: 0 | Status: SHIPPED | OUTPATIENT
Start: 2023-11-29 | End: 2023-12-04

## 2023-11-29 RX ORDER — ONDANSETRON 4 MG/1
4 TABLET, FILM COATED ORAL EVERY 6 HOURS PRN
Qty: 16 TABLET | Refills: 0 | Status: SHIPPED | OUTPATIENT
Start: 2023-11-29 | End: 2023-11-29

## 2023-12-05 ENCOUNTER — ANESTHESIA EVENT (OUTPATIENT)
Dept: SURGERY | Facility: AMBULATORY SURGERY CENTER | Age: 23
End: 2023-12-05
Payer: COMMERCIAL

## 2023-12-06 ENCOUNTER — HOSPITAL ENCOUNTER (OUTPATIENT)
Facility: AMBULATORY SURGERY CENTER | Age: 23
Discharge: HOME | End: 2023-12-06
Attending: PLASTIC SURGERY | Admitting: PLASTIC SURGERY
Payer: COMMERCIAL

## 2023-12-06 ENCOUNTER — ANESTHESIA (OUTPATIENT)
Dept: SURGERY | Facility: AMBULATORY SURGERY CENTER | Age: 23
End: 2023-12-06
Payer: COMMERCIAL

## 2023-12-06 VITALS
SYSTOLIC BLOOD PRESSURE: 127 MMHG | TEMPERATURE: 97.2 F | RESPIRATION RATE: 14 BRPM | HEART RATE: 75 BPM | WEIGHT: 139 LBS | DIASTOLIC BLOOD PRESSURE: 71 MMHG | HEIGHT: 66 IN | BODY MASS INDEX: 22.34 KG/M2 | OXYGEN SATURATION: 97 %

## 2023-12-06 PROCEDURE — 19325 BREAST AUGMENTATION W/IMPLT: CPT | Mod: 50

## 2023-12-06 PROCEDURE — 19325 BREAST AUGMENTATION W/IMPLT: CPT | Mod: 50 | Performed by: PLASTIC SURGERY

## 2023-12-06 DEVICE — IMPLANTABLE DEVICE: Type: IMPLANTABLE DEVICE | Site: BREAST | Status: FUNCTIONAL

## 2023-12-06 RX ORDER — SODIUM CHLORIDE, SODIUM LACTATE, POTASSIUM CHLORIDE, CALCIUM CHLORIDE 600; 310; 30; 20 MG/100ML; MG/100ML; MG/100ML; MG/100ML
INJECTION, SOLUTION INTRAVENOUS CONTINUOUS
Status: DISCONTINUED | OUTPATIENT
Start: 2023-12-06 | End: 2023-12-06 | Stop reason: HOSPADM

## 2023-12-06 RX ORDER — OXYCODONE HYDROCHLORIDE 5 MG/1
10 TABLET ORAL
Status: DISCONTINUED | OUTPATIENT
Start: 2023-12-06 | End: 2023-12-07 | Stop reason: HOSPADM

## 2023-12-06 RX ORDER — CEFAZOLIN SODIUM 2 G/50ML
2 SOLUTION INTRAVENOUS
Status: COMPLETED | OUTPATIENT
Start: 2023-12-06 | End: 2023-12-06

## 2023-12-06 RX ORDER — HYDROMORPHONE HYDROCHLORIDE 1 MG/ML
0.2 INJECTION, SOLUTION INTRAMUSCULAR; INTRAVENOUS; SUBCUTANEOUS EVERY 5 MIN PRN
Status: DISCONTINUED | OUTPATIENT
Start: 2023-12-06 | End: 2023-12-06 | Stop reason: HOSPADM

## 2023-12-06 RX ORDER — ACETAMINOPHEN 325 MG/1
975 TABLET ORAL ONCE
Status: COMPLETED | OUTPATIENT
Start: 2023-12-06 | End: 2023-12-06

## 2023-12-06 RX ORDER — FENTANYL CITRATE 50 UG/ML
50 INJECTION, SOLUTION INTRAMUSCULAR; INTRAVENOUS EVERY 5 MIN PRN
Status: DISCONTINUED | OUTPATIENT
Start: 2023-12-06 | End: 2023-12-06 | Stop reason: HOSPADM

## 2023-12-06 RX ORDER — ONDANSETRON 2 MG/ML
4 INJECTION INTRAMUSCULAR; INTRAVENOUS EVERY 30 MIN PRN
Status: DISCONTINUED | OUTPATIENT
Start: 2023-12-06 | End: 2023-12-07 | Stop reason: HOSPADM

## 2023-12-06 RX ORDER — LABETALOL HYDROCHLORIDE 5 MG/ML
10 INJECTION, SOLUTION INTRAVENOUS
Status: DISCONTINUED | OUTPATIENT
Start: 2023-12-06 | End: 2023-12-06 | Stop reason: HOSPADM

## 2023-12-06 RX ORDER — DEXAMETHASONE SODIUM PHOSPHATE 4 MG/ML
INJECTION, SOLUTION INTRA-ARTICULAR; INTRALESIONAL; INTRAMUSCULAR; INTRAVENOUS; SOFT TISSUE PRN
Status: DISCONTINUED | OUTPATIENT
Start: 2023-12-06 | End: 2023-12-06

## 2023-12-06 RX ORDER — ONDANSETRON 4 MG/1
4 TABLET, ORALLY DISINTEGRATING ORAL EVERY 30 MIN PRN
Status: DISCONTINUED | OUTPATIENT
Start: 2023-12-06 | End: 2023-12-06 | Stop reason: HOSPADM

## 2023-12-06 RX ORDER — FENTANYL CITRATE 50 UG/ML
25 INJECTION, SOLUTION INTRAMUSCULAR; INTRAVENOUS EVERY 5 MIN PRN
Status: DISCONTINUED | OUTPATIENT
Start: 2023-12-06 | End: 2023-12-06 | Stop reason: HOSPADM

## 2023-12-06 RX ORDER — KETAMINE HYDROCHLORIDE 10 MG/ML
INJECTION INTRAMUSCULAR; INTRAVENOUS PRN
Status: DISCONTINUED | OUTPATIENT
Start: 2023-12-06 | End: 2023-12-06

## 2023-12-06 RX ORDER — PROPOFOL 10 MG/ML
INJECTION, EMULSION INTRAVENOUS PRN
Status: DISCONTINUED | OUTPATIENT
Start: 2023-12-06 | End: 2023-12-06

## 2023-12-06 RX ORDER — LIDOCAINE HYDROCHLORIDE 20 MG/ML
INJECTION, SOLUTION INFILTRATION; PERINEURAL PRN
Status: DISCONTINUED | OUTPATIENT
Start: 2023-12-06 | End: 2023-12-06

## 2023-12-06 RX ORDER — ONDANSETRON 2 MG/ML
4 INJECTION INTRAMUSCULAR; INTRAVENOUS EVERY 30 MIN PRN
Status: DISCONTINUED | OUTPATIENT
Start: 2023-12-06 | End: 2023-12-06 | Stop reason: HOSPADM

## 2023-12-06 RX ORDER — OXYCODONE HYDROCHLORIDE 5 MG/1
5 TABLET ORAL
Status: COMPLETED | OUTPATIENT
Start: 2023-12-06 | End: 2023-12-06

## 2023-12-06 RX ORDER — GLYCOPYRROLATE 0.2 MG/ML
INJECTION, SOLUTION INTRAMUSCULAR; INTRAVENOUS PRN
Status: DISCONTINUED | OUTPATIENT
Start: 2023-12-06 | End: 2023-12-06

## 2023-12-06 RX ORDER — SODIUM CHLORIDE, SODIUM LACTATE, POTASSIUM CHLORIDE, CALCIUM CHLORIDE 600; 310; 30; 20 MG/100ML; MG/100ML; MG/100ML; MG/100ML
INJECTION, SOLUTION INTRAVENOUS CONTINUOUS PRN
Status: DISCONTINUED | OUTPATIENT
Start: 2023-12-06 | End: 2023-12-06

## 2023-12-06 RX ORDER — HYDROMORPHONE HYDROCHLORIDE 1 MG/ML
0.4 INJECTION, SOLUTION INTRAMUSCULAR; INTRAVENOUS; SUBCUTANEOUS EVERY 5 MIN PRN
Status: DISCONTINUED | OUTPATIENT
Start: 2023-12-06 | End: 2023-12-06 | Stop reason: HOSPADM

## 2023-12-06 RX ORDER — FENTANYL CITRATE 50 UG/ML
INJECTION, SOLUTION INTRAMUSCULAR; INTRAVENOUS PRN
Status: DISCONTINUED | OUTPATIENT
Start: 2023-12-06 | End: 2023-12-06

## 2023-12-06 RX ORDER — ONDANSETRON 2 MG/ML
INJECTION INTRAMUSCULAR; INTRAVENOUS PRN
Status: DISCONTINUED | OUTPATIENT
Start: 2023-12-06 | End: 2023-12-06

## 2023-12-06 RX ORDER — ONDANSETRON 4 MG/1
4 TABLET, ORALLY DISINTEGRATING ORAL EVERY 30 MIN PRN
Status: DISCONTINUED | OUTPATIENT
Start: 2023-12-06 | End: 2023-12-07 | Stop reason: HOSPADM

## 2023-12-06 RX ORDER — LIDOCAINE 40 MG/G
CREAM TOPICAL
Status: DISCONTINUED | OUTPATIENT
Start: 2023-12-06 | End: 2023-12-06 | Stop reason: HOSPADM

## 2023-12-06 RX ORDER — PROPOFOL 10 MG/ML
INJECTION, EMULSION INTRAVENOUS CONTINUOUS PRN
Status: DISCONTINUED | OUTPATIENT
Start: 2023-12-06 | End: 2023-12-06

## 2023-12-06 RX ADMIN — CEFAZOLIN SODIUM 2 G: 2 SOLUTION INTRAVENOUS at 12:00

## 2023-12-06 RX ADMIN — SODIUM CHLORIDE, SODIUM LACTATE, POTASSIUM CHLORIDE, CALCIUM CHLORIDE: 600; 310; 30; 20 INJECTION, SOLUTION INTRAVENOUS at 13:40

## 2023-12-06 RX ADMIN — Medication 200 MCG: at 12:13

## 2023-12-06 RX ADMIN — Medication 100 MCG: at 13:26

## 2023-12-06 RX ADMIN — LIDOCAINE HYDROCHLORIDE 60 MG: 20 INJECTION, SOLUTION INFILTRATION; PERINEURAL at 11:53

## 2023-12-06 RX ADMIN — DEXAMETHASONE SODIUM PHOSPHATE 4 MG: 4 INJECTION, SOLUTION INTRA-ARTICULAR; INTRALESIONAL; INTRAMUSCULAR; INTRAVENOUS; SOFT TISSUE at 11:53

## 2023-12-06 RX ADMIN — Medication 100 MCG: at 12:11

## 2023-12-06 RX ADMIN — Medication 100 MCG: at 12:57

## 2023-12-06 RX ADMIN — GLYCOPYRROLATE 0.2 MG: 0.2 INJECTION, SOLUTION INTRAMUSCULAR; INTRAVENOUS at 12:11

## 2023-12-06 RX ADMIN — Medication 100 MCG: at 13:09

## 2023-12-06 RX ADMIN — Medication 100 MCG: at 13:17

## 2023-12-06 RX ADMIN — Medication 100 MCG: at 13:40

## 2023-12-06 RX ADMIN — ONDANSETRON 4 MG: 2 INJECTION INTRAMUSCULAR; INTRAVENOUS at 13:51

## 2023-12-06 RX ADMIN — SODIUM CHLORIDE, SODIUM LACTATE, POTASSIUM CHLORIDE, CALCIUM CHLORIDE: 600; 310; 30; 20 INJECTION, SOLUTION INTRAVENOUS at 11:48

## 2023-12-06 RX ADMIN — Medication 100 MCG: at 12:04

## 2023-12-06 RX ADMIN — OXYCODONE HYDROCHLORIDE 5 MG: 5 TABLET ORAL at 14:25

## 2023-12-06 RX ADMIN — ACETAMINOPHEN 975 MG: 325 TABLET ORAL at 10:15

## 2023-12-06 RX ADMIN — FENTANYL CITRATE 50 MCG: 50 INJECTION, SOLUTION INTRAMUSCULAR; INTRAVENOUS at 11:56

## 2023-12-06 RX ADMIN — KETAMINE HYDROCHLORIDE 20 MG: 10 INJECTION INTRAMUSCULAR; INTRAVENOUS at 12:10

## 2023-12-06 RX ADMIN — PROPOFOL 300 MG: 10 INJECTION, EMULSION INTRAVENOUS at 11:53

## 2023-12-06 RX ADMIN — FENTANYL CITRATE 50 MCG: 50 INJECTION, SOLUTION INTRAMUSCULAR; INTRAVENOUS at 11:53

## 2023-12-06 RX ADMIN — PROPOFOL 150 MCG/KG/MIN: 10 INJECTION, EMULSION INTRAVENOUS at 11:53

## 2023-12-06 NOTE — ANESTHESIA PREPROCEDURE EVALUATION
"Anesthesia Pre-Procedure Evaluation    Patient: Sofia J Behnken   MRN: [id number] : 2000        Procedure : Procedure(s):  AUGMENTATION, BREAST, BILATERAL          Past Medical History:   Diagnosis Date    Anxiety     Bipolar disorder (H)     Depression     Depressive disorder     Uncomplicated asthma       Past Surgical History:   Procedure Laterality Date    EYE SURGERY      blocked tear duct       Allergies   Allergen Reactions    Doxycycline GI Disturbance     Other reaction(s): GI intolerance  Nausea and vomiting and diarrhea after starting medication  Nausea and vomiting and diarrhea after starting medication      Azithromycin Rash      Social History     Tobacco Use    Smoking status: Former    Smokeless tobacco: Never    Tobacco comments:     pt uses e cig   Substance Use Topics    Alcohol use: Yes     Comment: Alcoholic Drinks/day:6  drinks per week      Wt Readings from Last 1 Encounters:   23 63 kg (139 lb)              OUTSIDE LABS:  CBC: No results found for: \"WBC\", \"HGB\", \"HCT\", \"PLT\"  BMP: No results found for: \"NA\", \"POTASSIUM\", \"CHLORIDE\", \"CO2\", \"BUN\", \"CR\", \"GLC\"  COAGS: No results found for: \"PTT\", \"INR\", \"FIBR\"  POC: No results found for: \"BGM\", \"HCG\", \"HCGS\"  HEPATIC: No results found for: \"ALBUMIN\", \"PROTTOTAL\", \"ALT\", \"AST\", \"GGT\", \"ALKPHOS\", \"BILITOTAL\", \"BILIDIRECT\", \"NORRIS\"  OTHER: No results found for: \"PH\", \"LACT\", \"A1C\", \"STALIN\", \"PHOS\", \"MAG\", \"LIPASE\", \"AMYLASE\", \"TSH\", \"T4\", \"T3\", \"CRP\", \"SED\"    Anesthesia Plan    ASA Status:  1    NPO Status:  NPO Appropriate    Anesthesia Type: General.     - Airway: LMA   Induction: Intravenous, Propofol.   Maintenance: Balanced.        Consents    Anesthesia Plan(s) and associated risks, benefits, and realistic alternatives discussed. Questions answered and patient/representative(s) expressed understanding.     - Discussed:     - Discussed with:  Patient      - Extended Intubation/Ventilatory Support Discussed: No.      - Patient is " DNR/DNI Status: No     Use of blood products discussed: No .     Postoperative Care    Pain management: IV analgesics, Oral pain medications, Multi-modal analgesia.   PONV prophylaxis: Dexamethasone or Solumedrol, Ondansetron (or other 5HT-3), Background Propofol Infusion     Comments:    Other Comments: No block    LMA vs ETT per surgeon preference           Rony Johnson MD    I have reviewed the pertinent notes and labs in the chart from the past 30 days and (re)examined the patient.  Any updates or changes from those notes are reflected in this note.

## 2023-12-06 NOTE — ANESTHESIA POSTPROCEDURE EVALUATION
Patient: Sofia J Behnken    Procedure: Procedure(s):  AUGMENTATION, BREAST, BILATERAL       Anesthesia Type:  General    Note:  Disposition: Outpatient   Postop Pain Control: Uneventful            Sign Out: Well controlled pain   PONV: No   Neuro/Psych: Uneventful            Sign Out: Acceptable/Baseline neuro status   Airway/Respiratory: Uneventful            Sign Out: Acceptable/Baseline resp. status   CV/Hemodynamics: Uneventful            Sign Out: Acceptable CV status; No obvious hypovolemia; No obvious fluid overload   Other NRE:    DID A NON-ROUTINE EVENT OCCUR?            Last vitals:  Vitals Value Taken Time   /99 12/06/23 1426   Temp 36.2  C (97.1  F) 12/06/23 1424   Pulse 96 12/06/23 1427   Resp 9 12/06/23 1427   SpO2 99 % 12/06/23 1427   Vitals shown include unfiled device data.    Electronically Signed By: Rony Johnson MD  December 6, 2023  3:10 PM

## 2023-12-06 NOTE — DISCHARGE INSTRUCTIONS
[facility]  Home Care Following Anesthesia  For 24 hours after surgery:  Get plenty of rest.  A responsible adult must stay with you for at least 24 hours after you leave the surgery center.  Do not drive or use heavy equipment.  If you have weakness or tingling, don't drive or use heavy equipment until this feeling goes away.   Do not drink alcohol.   Avoid strenuous or risky activities.  Ask for help when climbing stairs.  You may feel lightheaded.  IF so, sit for a few minutes before standing.  Have someone help you get up.   If you have nausea (feel sick to your stomach): Drink only clear liquids such as apple juice, ginger ale, broth or 7-Up.  Rest may also help.  Be sure to drink enough fluids.  Move to a regular diet as you feel able.   You may have a slight fever.  Call the doctor if your fever is over 100 F (37.7 C) (taken under the tongue) or lasts longer than 24 hours.  You may have a dry mouth, a sore throat, muscle aches or trouble sleeping. These should go away after 24 hours.  Do not make important or legal decisions.   It is recommended to avoid smoking.               Tips for taking pain medications  To get the best pain relief possible, remember these points:  Take pain medications as directed, before pain becomes severe.  Pain medication can upset your stomach: taking it with food may help.  Constipation is a common side effect of pain medication. Drink plenty of  fluids.  Eat foods high in fiber. Take a stool softener if recommended by your doctor or pharmacist.  Do not drink alcohol, drive or operate machinery while taking pain medications.  Ask about other ways to control pain, such as with heat, ice or relaxation.    Tylenol/Acetaminophen Consumption    If you feel your pain relief is insufficient, you may take Tylenol/Acetaminophen in addition to your narcotic pain medication.   Be careful not to exceed 4,000 mg of Tylenol/Acetaminophen in a 24 hour  period from all sources.  If you are taking extra strength Tylenol/acetaminophen (500 mg), the maximum dose is 8 tablets in 24 hours.  If you are taking regular strength acetaminophen (325 mg), the maximum dose is 12 tablets in 24 hours.    Call a doctor for any of the following:  Signs of infection (fever, growing tenderness at the surgery site, a large amount of drainage or bleeding, severe pain, foul-smelling drainage, redness, swelling).  It has been over 8 to 10 hours since surgery and you are still not able to urinate (pass water).  Headache for over 24 hours.  Numbness, tingling or weakness the day after surgery (if you had spinal anesthesia).  Signs of Covid-19 infection (temperature over 100 degrees, shortness of breath, cough, loss of taste/smell, generalized body aches, persistent headache, chills, sore throat, nausea/vomiting/diarrhea)  Your doctor is: Dr. Richard Kim, Plastic Surgery: [phone]               Or dial [phone] and ask for the resident on call for:  Plastics  For emergency care, call the:  [facility]:  [phone] (TTY for hearing impaired: [phone])

## 2023-12-06 NOTE — ANESTHESIA CARE TRANSFER NOTE
Patient: Sofia J Behnken    Procedure: Procedure(s):  AUGMENTATION, BREAST, BILATERAL       Diagnosis: Gender dysphoria [F64.9]  Diagnosis Additional Information: No value filed.    Anesthesia Type:   General     Note:      Level of Consciousness: drowsy and awake  Oxygen Supplementation: room air    Independent Airway: airway patency satisfactory and stable  Dentition: dentition unchanged  Vital Signs Stable: post-procedure vital signs reviewed and stable  Report to RN Given: handoff report given  Patient transferred to: PACU    Handoff Report: Identifed the Patient, Identified the Reponsible Provider, Reviewed the pertinent medical history, Discussed the surgical course, Reviewed Intra-OP anesthesia mangement and issues during anesthesia, Set expectations for post-procedure period and Allowed opportunity for questions and acknowledgement of understanding    Vitals:  Vitals Value Taken Time   /58 12/06/23 1412   Temp 35.9  C (96.6  F) 12/06/23 1412   Pulse 64 12/06/23 1415   Resp 5 12/06/23 1415   SpO2 98 % 12/06/23 1415   Vitals shown include unfiled device data.    Electronically Signed By: LA Delcid CRNA  December 6, 2023  2:16 PM

## 2023-12-06 NOTE — ANESTHESIA PROCEDURE NOTES
Airway       Patient location during procedure: OR  Staff -        CRNA: Manda Blanco APRN CRNA       Performed By: CRNAIndications and Patient Condition       Indications for airway management: carol-procedural       Induction type:intravenous       Mask difficulty assessment: 1 - vent by mask    Final Airway Details       Final airway type: supraglottic airway    Supraglottic Airway Details        Type: LMA       Brand: I-Gel       LMA size: 5    Post intubation assessment        Placement verified by: capnometry and equal breath sounds        Number of attempts at approach: 2       Secured with: tape       Ease of procedure: easy       Dentition: Intact and Unchanged

## 2023-12-07 NOTE — OP NOTE
Sofia J Behnken    December 6, 2023    Preoperative diagnosis: Gender dysphoria     Postoperative diagnosis: same     Procedure:      1) Bilateral augmentation mammoplasties in the sub glandular space.     For the right breast a 385 ml Allergan full profile implant was utilized.  Reference SCF-385 and SN [id number].    For the left breast a 385 ml Allergan full profile implant was utilized.  Reference SCF-385 and SN [id number].      Surgeon: Richard Kim MD.     Assistant: Taina Hernandez CSA (there was no plastic surgery resident available to assist).     Anesthesia: General     IV fluids: 1000 ml     Findings: Macroscopically normal-appearing breasts.       Specimen: None     Drains: None     Disposition: Patient tolerated procedure well and was extubated and transferred to recovery room awake and in stable condition.     Indications:     This is 23 year old patient with diagnosis of gender dysphoria.  The patient met WPATH and insurance criteria for gender affirming top surgery.  Patient wishes to undergo bilateral augmentation mammoplasties.  Based upon patient's anatomy and skin thickness, the breast implants will be placed in the sub glandular space.  Also, it is possible that one or both inframammary folds (IMF) will have to be lowered in order to ensure proper position of the breast implant behind each nipple areolar complex (NAC).     Risks were explained to the patient and they include but are not limited to: scarring, infection, potential explantation of one or both implants, bleeding, hematoma, seroma, temporary or permanent altered sensation on breast's skin and NAC, implant malposition, breast asymmetry, asymmetry in the location of the IMF, capsular contracture, rippling, animation deformity, the pneumothorax, chest tube placement, need for further surgeries.  Patient has acknowledged all these risks and has agreed to proceed signing informed consent.     Procedure:     Patient was identified in the  preoperative holding area and preoperative IV antibiotic was given to the patient.    I then proceeded to perform preoperative markings on the patient's chest.  First I draw the midline, I also marked 2 cm bilaterally and lateral to the midline on each medial aspect of patient's breast in order to make sure that the dissection on the breast pocket will stay 2 cm laterally from the midline on each side.  This will prevent symmastia.  Also, I marked the breast footprint on the anterior axillary line bilaterally in order to make sure that dissection of the breast pocket will not go beyond this line.  Furthermore, I marked the breast meridian on each breast as well as the native inframammary fold (IMF) of each breast.  I also measured the distance from the NAC to the IMF on each breast.     On this particular patient, the NAC to IMF distance on the right breast was 7 cm and said distance on the left breast was 7 cm as well.      The proposed incision on each inframammary fold was 5 cm length, 2.5 cm to each side of the breast meridian at the level of the IMF.      I also marked the superior aspect of bilateral breast footprint.       This is how the preoperative markings looked like:                   After completing these markings, patient was then brought to the operating room and was placed supine on the operating room table.  Sequential compression devices were activated, general endotracheal anesthesia was obtained and Hood catheter was placed.  Then, the neck, chest and upper abdomen were prepped and draped in sterile surgical fashion.    With scalpel #15 I proceeded to make incision on the right breast IMF following the preoperative markings.  Subcutaneous tissues were divided with electrocautery until I reach the fascia of the pectoralis major muscle.  Utilizing light retractor, I proceeded to dissect the sub glandular pocket following the preoperative markings.       Copious irrigation with antibiotic  "solution was provided and I found that the hemostasis was excellent.  At this time I packed this space with a moist sponge and proceeded to perform the above mentioned steps on the contralateral breast.    At this time I inspected the right breast and confirmed excellent hemostasis.  Utilizing patient's preoperative breast diameter, I brought a 385 ml full profile sizer which was introduced on this breast.  I also proceeded to remove the contralateral breast sponge and confirmed excellent hemostasis there as well.  I also introduced a second sizer of the same size on this breast.    Patient was then sat up and I confirmed excellent symmetry on both breasts and an harmonious relationship between the breast implant and patient's chest frame.  Therefore, I decided to utilize this volume for the permanent breast implants.    Patient was then placed back in the supine position, and the sizers were removed.  Then, I proceeded to apply the stitches to close the bilateral inframammary folds.  I applied four central interrupted stitches on bilateral inframammary folds utilizing 2-0 PDS.  These stitches grasped the breast capsule and were anchored into the periosteum of the underlying rib.  This was very important to prevent implant malposition and bottoming out .The ends of each stitch was secured with multiple hemostats.    With this technique, I ensured that no needles will be in contact when the permanent implants are placed.     At this time, I proceeded to irrigate again both cavities with antibiotic solution followed by chlorhexidine solution.  I also proceeded to repaint the chest with chlorhexidine and applied new sterile towels on the field.  Our scrub tech, my assistant and I proceeded to change to new gloves and utilizing a Gatica funnel, the permanent breast implants which were bathed in antibiotic solution, where inserted in bilateral breast pockets with the \"no touch\" technique.     All the previously applied " stitches were then tied down and bilateral IMF were closed. Subcutaneous tissues were approximated with multiple 3-0 Monocryl buried interrupted stitches. Finally the skin was closed with 4-0 Monocryl running subcuticular stitches.  Dermabond and Prineo strip were applied on top of each incision, followed by Mastisol and a medium size Tegaderm as dressing.  Patient also received a supporting a sports bra.    Instrument count was reported by nursing personnel as correct.  Patient tolerated procedure well and was extubated and transferred to recovery room awake and in stable condition.    Richard Kim MD , FACS   Diplomate American Board of Plastic Surgery  Diplomate American Board of Surgery  Adj. Assistant Professor of Surgery  Division of Plastic & Reconstructive Surgery   [facility]  Office: [phone]   12/6/2023 at 8:16 PM

## 2023-12-08 ENCOUNTER — OFFICE VISIT (OUTPATIENT)
Dept: PLASTIC SURGERY | Facility: AMBULATORY SURGERY CENTER | Age: 23
End: 2023-12-08
Payer: COMMERCIAL

## 2023-12-08 DIAGNOSIS — Z98.82 HISTORY OF BREAST AUGMENTATION: Primary | ICD-10-CM

## 2023-12-08 PROCEDURE — 99024 POSTOP FOLLOW-UP VISIT: CPT | Performed by: PLASTIC SURGERY

## 2023-12-08 RX ORDER — ESTRADIOL VALERATE 10 MG/ML
INJECTION INTRAMUSCULAR
COMMUNITY
Start: 2023-08-31

## 2023-12-08 NOTE — PROGRESS NOTES
Deana is a 23 years old patient status post bilateral breast augmentation.  She is 2 days out from surgery.  She is doing well.    At the physical exam, incisions are healing well.  No sign of infection or dehiscence.  There is no evidence of hematoma or seroma.  Good symmetry and shape bilaterally.    Plan: Patient may have regular showers, continue with sports bra for the next 5 weeks, follow-up with me in 1 week for reevaluation and removal of Tegaderm dressings on the inframammary fold incisions.  Patient is doing excellent from plastic surgery standpoint.    Richard Kim MD , FACS   Diplomate American Board of Plastic Surgery  Diplomate American Board of Surgery  Adj. Assistant Professor of Surgery  Division of Plastic & Reconstructive Surgery   [facility]  Office: [phone]   12/8/2023 at 12:22 PM

## 2023-12-08 NOTE — LETTER
12/8/2023         RE: Sofia J Behnken  [address] [place] [address]        Dear Colleague,    Thank you for referring your patient, Sofia J Behnken, to the [facility] PLASTIC SURGERY [facility]INIC [place]. Please see a copy of my visit note below.    Deana is a 23 years old patient status post bilateral breast augmentation.  She is 2 days out from surgery.  She is doing well.    At the physical exam, incisions are healing well.  No sign of infection or dehiscence.  There is no evidence of hematoma or seroma.  Good symmetry and shape bilaterally.    Plan: Patient may have regular showers, continue with sports bra for the next 5 weeks, follow-up with me in 1 week for reevaluation and removal of Tegaderm dressings on the inframammary fold incisions.  Patient is doing excellent from plastic surgery standpoint.    Richard Kim MD , FACS   Diplomate American Board of Plastic Surgery  Diplomate American Board of Surgery  Adj. Assistant Professor of Surgery  Division of Plastic & Reconstructive Surgery   [facility]  Office: [phone]   12/8/2023 at 12:22 PM       Again, thank you for allowing me to participate in the care of your patient.        Sincerely,        Richard Kim MD

## 2023-12-15 ENCOUNTER — OFFICE VISIT (OUTPATIENT)
Dept: PLASTIC SURGERY | Facility: AMBULATORY SURGERY CENTER | Age: 23
End: 2023-12-15
Payer: COMMERCIAL

## 2023-12-15 DIAGNOSIS — Z98.890 HISTORY OF AUGMENTATION OF BOTH BREASTS: Primary | ICD-10-CM

## 2023-12-15 PROCEDURE — 99024 POSTOP FOLLOW-UP VISIT: CPT | Performed by: PLASTIC SURGERY

## 2023-12-15 NOTE — LETTER
12/15/2023         RE: Sofia J Behnken  [address] [address]        Dear Colleague,    Thank you for referring your patient, Sofia J Behnken, to the [facility] PLASTIC SURGERY CLINIC [place]. Please see a copy of my visit note below.    Deana is a 23 years old patient status post bilateral breast augmentation.  She is 9 days out from surgery.  She is doing well.    At the physical exam, all incisions are healing well.  No sign of infection or wound dehiscence.  There is no evidence of hematoma or seroma.  Good shape and symmetry bilaterally.    Plan: May have regular showers, start applying antibiotic ointment along the incisions, continue with sports bra, follow-up with me in 1 week.  She is doing excellent from plastic surgery standpoint.    Richard Kim MD , FACS   Diplomate American Board of Plastic Surgery  Diplomate American Board of Surgery  Adj. Assistant Professor of Surgery  Division of Plastic & Reconstructive Surgery   [facility]  Office: [phone]   12/15/2023 at 12:12 PM         Again, thank you for allowing me to participate in the care of your patient.        Sincerely,        Richard Kim MD

## 2023-12-15 NOTE — PROGRESS NOTES
Deana is a 23 years old patient status post bilateral breast augmentation.  She is 9 days out from surgery.  She is doing well.    At the physical exam, all incisions are healing well.  No sign of infection or wound dehiscence.  There is no evidence of hematoma or seroma.  Good shape and symmetry bilaterally.    Plan: May have regular showers, start applying antibiotic ointment along the incisions, continue with sports bra, follow-up with me in 1 week.  She is doing excellent from plastic surgery standpoint.    Richard Kim MD , FACS   Diplomate American Board of Plastic Surgery  Diplomate American Board of Surgery  Adj. Assistant Professor of Surgery  Division of Plastic & Reconstructive Surgery   [facility]  Office: [phone]   12/15/2023 at 12:12 PM

## 2023-12-16 ENCOUNTER — NURSE TRIAGE (OUTPATIENT)
Dept: NURSING | Facility: CLINIC | Age: 23
End: 2023-12-16
Payer: COMMERCIAL

## 2023-12-16 ENCOUNTER — MYC MEDICAL ADVICE (OUTPATIENT)
Dept: PLASTIC SURGERY | Facility: AMBULATORY SURGERY CENTER | Age: 23
End: 2023-12-16
Payer: COMMERCIAL

## 2023-12-16 NOTE — TELEPHONE ENCOUNTER
Deana calling post op day 10 following Breast augmentation. She is asking if it is okay to take a trip to [place]? She has had her surgery follow up appointment and is doing excellent. She denies symptoms or need for triage. She does not have any restrictions regarding her surgery about air travel.  Leigha Cárdenas RN on 12/16/2023 at 12:44 PM      Reason for Disposition   General information question, no triage required and triager able to answer question    Protocols used: Information Only Call - No Triage-A-

## 2023-12-22 ENCOUNTER — OFFICE VISIT (OUTPATIENT)
Dept: PLASTIC SURGERY | Facility: AMBULATORY SURGERY CENTER | Age: 23
End: 2023-12-22
Payer: COMMERCIAL

## 2023-12-22 VITALS — WEIGHT: 139 LBS | BODY MASS INDEX: 22.34 KG/M2 | HEIGHT: 66 IN

## 2023-12-22 DIAGNOSIS — Z98.890 HISTORY OF AUGMENTATION OF BOTH BREASTS: Primary | ICD-10-CM

## 2023-12-22 PROCEDURE — 99024 POSTOP FOLLOW-UP VISIT: CPT | Performed by: PLASTIC SURGERY

## 2023-12-22 ASSESSMENT — PAIN SCALES - GENERAL: PAINLEVEL: NO PAIN (0)

## 2023-12-22 NOTE — PROGRESS NOTES
Deana is a 23 years old lady status post bilateral breast augmentation.  She is 16 days out from surgery.  She is doing well.  She already traveled to [place].    At the physical exam, patient presents with excellent shape and symmetry bilaterally.  No evidence of late seroma or hematoma.  No evidence of surgical site infection or wound dehiscence.    Plan: Start applying cocoa butter lotion along the incisions, continue sports bra for another 4 weeks, avoid heavy lifting for the next 4 weeks and follow-up with me in 1 month.  Patient is doing excellent from plastic surgery standpoint.    Richard Kim MD , FACS   Diplomate American Board of Plastic Surgery  Diplomate American Board of Surgery  Adj. Assistant Professor of Surgery  Division of Plastic & Reconstructive Surgery   [facility]  Office: [phone]   12/22/2023 at 1:14 PM

## 2023-12-22 NOTE — LETTER
12/22/2023         RE: Sofia J Behnken  [address] [address]        Dear Colleague,    Thank you for referring your patient, Sofia J Behnken, to the [facility] PLASTIC SURGERY CLINIC [place]. Please see a copy of my visit note below.    Deana is a 23 years old lady status post bilateral breast augmentation.  She is 16 days out from surgery.  She is doing well.  She already traveled to [place].    At the physical exam, patient presents with excellent shape and symmetry bilaterally.  No evidence of late seroma or hematoma.  No evidence of surgical site infection or wound dehiscence.    Plan: Start applying cocoa butter lotion along the incisions, continue sports bra for another 4 weeks, avoid heavy lifting for the next 4 weeks and follow-up with me in 1 month.  Patient is doing excellent from plastic surgery standpoint.    Richard Kim MD , FACS   Diplomate American Board of Plastic Surgery  Diplomate American Board of Surgery  Adj. Assistant Professor of Surgery  Division of Plastic & Reconstructive Surgery   [facility]  Office: [phone]   12/22/2023 at 1:14 PM       Again, thank you for allowing me to participate in the care of your patient.        Sincerely,        Richard Kim MD

## 2023-12-28 ENCOUNTER — PATIENT OUTREACH (OUTPATIENT)
Dept: OTOLARYNGOLOGY | Facility: CLINIC | Age: 23
End: 2023-12-28
Payer: COMMERCIAL

## 2023-12-28 NOTE — PROGRESS NOTES
Called patient to let her know we would most likely need to wait until June,  but provider will reach out to colleague to see if she is able to do the procedure. Patient was agreeable to the current plan and verbalized understanding of the situation. Patient will reach out with any other questions or concerns in the meantime. Susan Mcgee RN on 12/28/2023 at 10:09 AM

## 2023-12-28 NOTE — PROGRESS NOTES
Called patient to offer earlier appointment. Patient is not able to make sooner appointment. Susan Mcgee RN on 12/28/2023 at 2:11 PM

## 2024-01-02 ENCOUNTER — PREP FOR PROCEDURE (OUTPATIENT)
Dept: OTOLARYNGOLOGY | Facility: CLINIC | Age: 24
End: 2024-01-02
Payer: COMMERCIAL

## 2024-01-02 DIAGNOSIS — F64.9 GENDER DYSPHORIA: Primary | ICD-10-CM

## 2024-01-03 ENCOUNTER — TELEPHONE (OUTPATIENT)
Dept: OTOLARYNGOLOGY | Facility: CLINIC | Age: 24
End: 2024-01-03
Payer: COMMERCIAL

## 2024-01-03 PROBLEM — F64.9 GENDER DYSPHORIA: Status: ACTIVE | Noted: 2022-09-02

## 2024-01-03 NOTE — TELEPHONE ENCOUNTER
Scheduled surgery with Dr. Krueger on 7/19/2024 - offered 6/21 but she declined    Spoke with: Patient    Surgery is located at [facility]    Patient will be seen for their H&P by:    PCP Dr. Arana within 30 days of surgery - Confirmed PCP on file is up to date    Anesthesia type: General      Requested Imaging required for surgery: NA    Patient is scheduled for their 2 week post op on 8/1 at 230pm    Patient will receive their packet via [facility] per their preference    Patient is aware they need a  to & from surgery    Additional comments: Patient will call back if they have any questions or concerns.    Lauren Thomas on 1/3/2024 at 2:13 PM

## 2024-02-08 ENCOUNTER — OFFICE VISIT (OUTPATIENT)
Dept: PLASTIC SURGERY | Facility: AMBULATORY SURGERY CENTER | Age: 24
End: 2024-02-08

## 2024-02-08 DIAGNOSIS — Z98.82 HISTORY OF BREAST AUGMENTATION: Primary | ICD-10-CM

## 2024-02-08 PROCEDURE — 99024 POSTOP FOLLOW-UP VISIT: CPT | Performed by: PLASTIC SURGERY

## 2024-02-08 NOTE — PROGRESS NOTES
Deana is a 23 years old patient status post bilateral breast augmentation.  She is 2-month out from surgery.  She is doing well.    At the physical exam, all incisions are well-healed.  Good shape and symmetry bilaterally.  [place] 1 for her breast capsules.                          Plan: Continue to apply cocoa butter lotion over the incisions, may resume normal activities and start utilizing regular bra.  Patient is doing excellent from plastic surgery standpoint.  Follow-up with me as needed.    Richard Kim MD , FACS   Diplomate American Board of Plastic Surgery  Diplomate American Board of Surgery  Adj. Assistant Professor of Surgery  Division of Plastic & Reconstructive Surgery   [facility]  Office: [phone]   2/8/2024 at 2:30 PM

## 2024-02-08 NOTE — LETTER
2/8/2024         RE: Sofia J Behnken  [address] [address]        Dear Colleague,    Thank you for referring your patient, Sofia J Behnken, to the [facility] PLASTIC SURGERY CLINIC [place]. Please see a copy of my visit note below.    Deana is a 23 years old patient status post bilateral breast augmentation.  She is 2-month out from surgery.  She is doing well.    At the physical exam, all incisions are well-healed.  Good shape and symmetry bilaterally.  [place] 1 for her breast capsules.                          Plan: Continue to apply cocoa butter lotion over the incisions, may resume normal activities and start utilizing regular bra.  Patient is doing excellent from plastic surgery standpoint.  Follow-up with me as needed.    Richard Kim MD , FACS   Diplomate American Board of Plastic Surgery  Diplomate American Board of Surgery  Adj. Assistant Professor of Surgery  Division of Plastic & Reconstructive Surgery   [facility]  Office: [phone]   2/8/2024 at 2:30 PM             Again, thank you for allowing me to participate in the care of your patient.        Sincerely,        Richard Kim MD

## 2024-06-06 ENCOUNTER — MEDICAL CORRESPONDENCE (OUTPATIENT)
Dept: HEALTH INFORMATION MANAGEMENT | Facility: CLINIC | Age: 24
End: 2024-06-06
Payer: COMMERCIAL

## 2024-06-12 ENCOUNTER — TELEPHONE (OUTPATIENT)
Dept: CONSULT | Facility: CLINIC | Age: 24
End: 2024-06-12
Payer: COMMERCIAL

## 2024-06-12 NOTE — TELEPHONE ENCOUNTER
Patient's chart reviewed by Genetics team--unable to schedule patient as department does not see adult patients for hypermobility/EDS. Letter sent to patient containing detailed explanation of this.

## 2024-06-12 NOTE — TELEPHONE ENCOUNTER
[facility] Call Center    Phone Message    May a detailed message be left on voicemail: yes     Reason for Call: Other: FYI: patient calling to schedule new adult genetics appt. Reports referral sent by Dr Juani Mcgowan, [facility], but not yet in system. Many thanks.     Action Taken: Message routed to:  Other: genetics scheduling    Travel Screening: Not Applicable

## 2024-06-13 ENCOUNTER — TRANSCRIBE ORDERS (OUTPATIENT)
Dept: OTHER | Age: 24
End: 2024-06-13

## 2024-06-13 DIAGNOSIS — Q79.60 EHLERS-DANLOS SYNDROME: Primary | ICD-10-CM

## 2024-06-13 NOTE — TELEPHONE ENCOUNTER
Rescheduled surgery with Dr. Krueger from 7/19/2024 to 11/15/2024    Spoke with: Patient    Reason for reschedule: Patient request    Surgery is located at [facility] [place] [id number]    Patient is aware their H&P will need to be within 30 days of their new surgery date     Is there imaging that needs to be rescheduled for new surgery date? No    Patients 2 week post op has been rescheduled to 11/29 at 930am    Additional comments: Patient will call back if they have any questions or concerns.    Lauren Thomas on 6/13/2024 at 8:21 AM

## 2024-06-20 ENCOUNTER — PATIENT OUTREACH (OUTPATIENT)
Dept: OTOLARYNGOLOGY | Facility: CLINIC | Age: 24
End: 2024-06-20
Payer: COMMERCIAL

## 2024-06-20 NOTE — PROGRESS NOTES
Called patient to go over pre-op teaching. Requested callback, and provided direct number. Susan Mcgee RN on 6/20/2024 at 8:28 AM

## 2024-08-06 ENCOUNTER — TELEPHONE (OUTPATIENT)
Dept: OTOLARYNGOLOGY | Facility: CLINIC | Age: 24
End: 2024-08-06
Payer: COMMERCIAL

## 2024-11-25 ENCOUNTER — OFFICE VISIT (OUTPATIENT)
Dept: FAMILY MEDICINE | Facility: CLINIC | Age: 24
End: 2024-11-25
Payer: COMMERCIAL

## 2024-11-25 VITALS
OXYGEN SATURATION: 98 % | BODY MASS INDEX: 23.13 KG/M2 | RESPIRATION RATE: 15 BRPM | HEIGHT: 65 IN | DIASTOLIC BLOOD PRESSURE: 80 MMHG | SYSTOLIC BLOOD PRESSURE: 116 MMHG | TEMPERATURE: 98.1 F | HEART RATE: 87 BPM

## 2024-11-25 DIAGNOSIS — F64.9 GENDER DYSPHORIA: Primary | ICD-10-CM

## 2024-11-25 DIAGNOSIS — Z11.3 ROUTINE SCREENING FOR STI (SEXUALLY TRANSMITTED INFECTION): ICD-10-CM

## 2024-11-25 PROBLEM — J45.909 EXTRINSIC ASTHMA: Status: ACTIVE | Noted: 2024-08-14

## 2024-11-25 PROBLEM — K62.89 ANAL OR RECTAL PAIN: Status: ACTIVE | Noted: 2024-04-12

## 2024-11-25 PROBLEM — G89.4 CHRONIC PAIN DISORDER: Status: ACTIVE | Noted: 2024-09-04

## 2024-11-25 PROBLEM — R11.0 NAUSEA: Status: ACTIVE | Noted: 2024-08-14

## 2024-11-25 PROBLEM — Z79.890 HORMONE REPLACEMENT THERAPY (HRT): Status: RESOLVED | Noted: 2022-09-02 | Resolved: 2024-11-25

## 2024-11-25 PROBLEM — K64.8 INTERNAL BLEEDING HEMORRHOIDS: Status: ACTIVE | Noted: 2024-05-03

## 2024-11-25 PROBLEM — K59.00 CONSTIPATION: Status: ACTIVE | Noted: 2024-05-03

## 2024-11-25 PROBLEM — Z00.00 ROUTINE GENERAL MEDICAL EXAMINATION AT A HEALTH CARE FACILITY: Status: RESOLVED | Noted: 2020-01-16 | Resolved: 2024-11-25

## 2024-11-25 PROBLEM — G43.009 MIGRAINE WITHOUT AURA AND WITHOUT STATUS MIGRAINOSUS, NOT INTRACTABLE: Status: ACTIVE | Noted: 2024-10-15

## 2024-11-25 PROBLEM — Q79.60 EHLERS-DANLOS SYNDROME: Status: ACTIVE | Noted: 2024-05-03

## 2024-11-25 LAB — HGB BLD-MCNC: 12.6 G/DL (ref 11.7–17.7)

## 2024-11-25 RX ORDER — SPIRONOLACTONE 50 MG/1
50 TABLET, FILM COATED ORAL 2 TIMES DAILY
Qty: 180 TABLET | Refills: 3 | Status: SHIPPED | OUTPATIENT
Start: 2024-11-25

## 2024-11-25 RX ORDER — PROGESTERONE 100 MG/1
200 CAPSULE ORAL DAILY
Qty: 90 CAPSULE | Refills: 3 | Status: SHIPPED | OUTPATIENT
Start: 2024-11-25

## 2024-11-25 RX ORDER — METHOCARBAMOL 750 MG/1
750 TABLET, FILM COATED ORAL 3 TIMES DAILY
COMMUNITY
Start: 2024-10-01

## 2024-11-25 RX ORDER — POLYETHYLENE GLYCOL 3350 17 G/17G
1 POWDER, FOR SOLUTION ORAL DAILY
COMMUNITY
Start: 2024-05-06 | End: 2024-11-25

## 2024-11-25 RX ORDER — VALACYCLOVIR HYDROCHLORIDE 1 G/1
TABLET, FILM COATED ORAL
COMMUNITY
Start: 2024-09-13

## 2024-11-25 RX ORDER — ESTRADIOL VALERATE 40 MG/ML
40 INJECTION INTRAMUSCULAR
Status: CANCELLED | OUTPATIENT
Start: 2024-11-25

## 2024-11-25 RX ORDER — CELECOXIB 100 MG/1
100 CAPSULE ORAL DAILY
COMMUNITY
Start: 2024-10-01 | End: 2024-11-25

## 2024-11-25 RX ORDER — ESTRADIOL VALERATE 10 MG/ML
INJECTION INTRAMUSCULAR
Status: CANCELLED | OUTPATIENT
Start: 2024-11-25

## 2024-11-25 RX ORDER — OMEPRAZOLE 40 MG/1
40 CAPSULE, DELAYED RELEASE ORAL DAILY
COMMUNITY
Start: 2023-07-22 | End: 2024-11-25

## 2024-11-25 RX ORDER — SUMATRIPTAN SUCCINATE 100 MG/1
100 TABLET ORAL
COMMUNITY
Start: 2024-10-15 | End: 2024-11-25

## 2024-11-25 RX ORDER — CETIRIZINE HYDROCHLORIDE 10 MG/1
10 TABLET ORAL DAILY
COMMUNITY
Start: 2024-09-13 | End: 2025-09-13

## 2024-11-25 ASSESSMENT — ASTHMA QUESTIONNAIRES
ACT_TOTALSCORE: 24
QUESTION_1 LAST FOUR WEEKS HOW MUCH OF THE TIME DID YOUR ASTHMA KEEP YOU FROM GETTING AS MUCH DONE AT WORK, SCHOOL OR AT HOME: NONE OF THE TIME
QUESTION_3 LAST FOUR WEEKS HOW OFTEN DID YOUR ASTHMA SYMPTOMS (WHEEZING, COUGHING, SHORTNESS OF BREATH, CHEST TIGHTNESS OR PAIN) WAKE YOU UP AT NIGHT OR EARLIER THAN USUAL IN THE MORNING: NOT AT ALL
QUESTION_2 LAST FOUR WEEKS HOW OFTEN HAVE YOU HAD SHORTNESS OF BREATH: NOT AT ALL
QUESTION_5 LAST FOUR WEEKS HOW WOULD YOU RATE YOUR ASTHMA CONTROL: WELL CONTROLLED
ACT_TOTALSCORE: 24
QUESTION_4 LAST FOUR WEEKS HOW OFTEN HAVE YOU USED YOUR RESCUE INHALER OR NEBULIZER MEDICATION (SUCH AS ALBUTEROL): NOT AT ALL

## 2024-11-25 NOTE — PROGRESS NOTES
Assessment & Plan     Gender dysphoria  Here just for gender care, will keep PCP closer to home in [place]. Stable on injectable estradiol, progesterone, jacinta. Pursuing hair removal, bottom surgery, no referrals needed. If labs WNL can plan q6-12mo visits.   - Comprehensive metabolic panel  - Lipid panel reflex to direct LDL Fasting  - Estradiol  - Testosterone total  - Hemoglobin    Routine screening for STI (sexually transmitted infection)  - HIV Antigen Antibody Combo Cascade  - Treponema Abs w Reflex to RPR and Titer  - Hepatitis C Screen Reflex to HCV RNA Quant and Genotype  - Chlamydia trachomatis/Neisseria gonorrhoeae by PCR  - Chlamydia trachomatis/Neisseria gonorrhoeae by PCR  - Chlamydia trachomatis/Neisseria gonorrhoeae by PCR       Return in about 1 year (around 11/25/2025) for Follow up, with me, for HRT.    Leah Leblanc is a 24 year old, presenting for the following health issues:  New Patient (Pt is establishing care n+ HRT management )        11/25/2024     4:19 PM   Additional Questions   Roomed by Lazarok   Accompanied by self         11/25/2024   Declines Weight   Did patient decline having their weight taken? Yes          11/25/2024    Information    services provided? No        HPI        Any special concerns today?  no current concerns    On hormones?  YES +++ Shot day of the week? Thursday      Due for labs?  Yes      +++ Refills of meds needed?  Yes    Seeing a lot of good changes. Was originally on the pill, switched to injections. Subcutaneous.   Progesterone would've been same time as estradiol, jacinta. About 2y ago.     Not looking for a whole lot of additional changes.   Has been doing laser hair removal through [facility]  Top surgery through [facility] almost a year ago  Doing electrolysis, had to stop w/ new job -- needs to fit back into schedule. Already had a consult for bottom surgery.       General  HEENT      Cardiovascular (CV)  Chest Pains:  "No  Shortness of breath: No Chest  Decreased exercise tolerance:  No  Breast changes/development: N/A     Gastrointestinal (GI)  Abdominal pain: No  Change in appetite: No Skin      Genitourinary ()  No concerns, does want STI screening  Musculoskeletal  Leg pain or swelling: No     Psychiatric (Psych)  Stable                Objective    /80   Pulse 87   Temp 98.1  F (36.7  C) (Oral)   Resp 15   Ht 1.651 m (5' 5\")   SpO2 98%   BMI 23.13 kg/m    Body mass index is 23.13 kg/m .  Physical Exam   GENERAL: alert, cooperative, in no acute distress  HEENT: sclera clear  PULM: normal respiratory effort   NEURO: alert and oriented, grossly intact, moves all extremities, normal gait   SKIN: no rashes or lesions visualized   PSYCH: euthymic affect              Signed Electronically by: Sharon Saul DO    "

## 2024-11-26 LAB
ALBUMIN SERPL BCG-MCNC: 4.5 G/DL (ref 3.5–5.2)
ALP SERPL-CCNC: 72 U/L (ref 40–150)
ALT SERPL W P-5'-P-CCNC: 14 U/L (ref 0–70)
ANION GAP SERPL CALCULATED.3IONS-SCNC: 11 MMOL/L (ref 7–15)
AST SERPL W P-5'-P-CCNC: 23 U/L (ref 0–45)
BILIRUB SERPL-MCNC: 0.4 MG/DL
BUN SERPL-MCNC: 9.4 MG/DL (ref 6–20)
C TRACH DNA SPEC QL PROBE+SIG AMP: NEGATIVE
CALCIUM SERPL-MCNC: 9.3 MG/DL (ref 8.8–10.4)
CHLORIDE SERPL-SCNC: 102 MMOL/L (ref 98–107)
CHOLEST SERPL-MCNC: 180 MG/DL
CREAT SERPL-MCNC: 0.77 MG/DL (ref 0.51–1.17)
EGFRCR SERPLBLD CKD-EPI 2021: >90 ML/MIN/1.73M2
ESTRADIOL SERPL-MCNC: 27 PG/ML
FASTING STATUS PATIENT QL REPORTED: NO
FASTING STATUS PATIENT QL REPORTED: NO
GLUCOSE SERPL-MCNC: 88 MG/DL (ref 70–99)
HCO3 SERPL-SCNC: 25 MMOL/L (ref 22–29)
HCV AB SERPL QL IA: NONREACTIVE
HDLC SERPL-MCNC: 72 MG/DL
HIV 1+2 AB+HIV1 P24 AG SERPL QL IA: NONREACTIVE
LDLC SERPL CALC-MCNC: 90 MG/DL
N GONORRHOEA DNA SPEC QL NAA+PROBE: NEGATIVE
NONHDLC SERPL-MCNC: 108 MG/DL
POTASSIUM SERPL-SCNC: 4.2 MMOL/L (ref 3.4–5.3)
PROT SERPL-MCNC: 7.4 G/DL (ref 6.4–8.3)
SODIUM SERPL-SCNC: 138 MMOL/L (ref 135–145)
T PALLIDUM AB SER QL: NONREACTIVE
TRIGL SERPL-MCNC: 90 MG/DL

## 2024-11-26 RX ORDER — ESTRADIOL VALERATE 10 MG/ML
INJECTION INTRAMUSCULAR
Qty: 5 ML | Refills: 3 | Status: SHIPPED | OUTPATIENT
Start: 2024-11-26

## 2024-11-27 ENCOUNTER — MYC MEDICAL ADVICE (OUTPATIENT)
Dept: FAMILY MEDICINE | Facility: CLINIC | Age: 24
End: 2024-11-27
Payer: COMMERCIAL

## 2024-11-27 DIAGNOSIS — F64.9 GENDER DYSPHORIA: Primary | ICD-10-CM

## 2024-11-27 LAB — TESTOST SERPL-MCNC: 6 NG/DL (ref 8–950)

## 2024-12-07 ENCOUNTER — HEALTH MAINTENANCE LETTER (OUTPATIENT)
Age: 24
End: 2024-12-07

## 2025-01-03 ENCOUNTER — MYC MEDICAL ADVICE (OUTPATIENT)
Dept: FAMILY MEDICINE | Facility: CLINIC | Age: 25
End: 2025-01-03
Payer: COMMERCIAL

## 2025-01-27 ENCOUNTER — LAB (OUTPATIENT)
Dept: LAB | Facility: CLINIC | Age: 25
End: 2025-01-27
Payer: COMMERCIAL

## 2025-01-27 DIAGNOSIS — F64.9 GENDER DYSPHORIA: ICD-10-CM

## 2025-01-27 PROCEDURE — 82670 ASSAY OF TOTAL ESTRADIOL: CPT

## 2025-01-27 PROCEDURE — 36415 COLL VENOUS BLD VENIPUNCTURE: CPT

## 2025-01-27 PROCEDURE — 84403 ASSAY OF TOTAL TESTOSTERONE: CPT

## 2025-01-28 LAB — ESTRADIOL SERPL-MCNC: 178 PG/ML

## 2025-01-30 LAB — TESTOST SERPL-MCNC: 9 NG/DL (ref 8–950)

## 2025-05-30 ENCOUNTER — MEDICAL CORRESPONDENCE (OUTPATIENT)
Dept: HEALTH INFORMATION MANAGEMENT | Facility: CLINIC | Age: 25
End: 2025-05-30
Payer: COMMERCIAL

## 2025-06-03 ENCOUNTER — PATIENT OUTREACH (OUTPATIENT)
Dept: CARE COORDINATION | Facility: CLINIC | Age: 25
End: 2025-06-03
Payer: MEDICAID

## 2025-06-05 ENCOUNTER — PATIENT OUTREACH (OUTPATIENT)
Dept: CARE COORDINATION | Facility: CLINIC | Age: 25
End: 2025-06-05
Payer: MEDICAID

## (undated) DEVICE — DRSG STERI STRIP 1/2X4" R1547

## (undated) DEVICE — STPL SKIN 35W 059037

## (undated) DEVICE — Device

## (undated) DEVICE — ADH LIQUID MASTISOL TOPICAL VIAL 2-3ML 0523-48

## (undated) DEVICE — ESU ELEC BLADE HEX-LOCKING 2.5" E1450X

## (undated) DEVICE — BASIN SET MINOR DISP

## (undated) DEVICE — SPONGE LAP 18X18" X8435

## (undated) DEVICE — SU VICRYL 3-0 SH 8X18" UND J864D

## (undated) DEVICE — SUCTION TIP YANKAUER W/O VENT K86

## (undated) DEVICE — UNIVERSAL DRAPE PACK 29118

## (undated) DEVICE — GLOVE BIOGEL PI MICRO INDICATOR UNDERGLOVE SZ 7.0 48970

## (undated) DEVICE — GLOVE GAMMEX NEOPRENE ULTRA SZ 7.5 LF 8515

## (undated) DEVICE — ESU GROUND PAD ADULT W/CORD E7507

## (undated) DEVICE — BLADE KNIFE SURG 15 371115

## (undated) DEVICE — GLOVE BIOGEL PI MICRO INDICATOR UNDERGLOVE SZ 8.0 48980

## (undated) DEVICE — LABEL MEDICATION SYSTEM 3303-P

## (undated) DEVICE — PREP CHLORAPREP 26ML TINTED ORANGE  260815

## (undated) DEVICE — SYR BULB IRRIG DOVER 60 ML LATEX FREE 67000

## (undated) DEVICE — PACK MINOR CUSTOM ASC

## (undated) DEVICE — BOWL 32OZ STERILE 01232

## (undated) DEVICE — BOWL STERILE 32OZ DYND50320

## (undated) DEVICE — ESU ELEC BLADE 6" COATED E1450-6

## (undated) DEVICE — DRSG TEGADERM 4X4 3/4" 1626W

## (undated) DEVICE — DRSG ABDOMINAL 07 1/2X8" 7197D

## (undated) DEVICE — SU PDS II 2-0 CT-1 27" Z339H

## (undated) DEVICE — SU MONOCRYL 3-0 SH 27" UND Y416H

## (undated) DEVICE — LINEN TOWEL PACK X5 5464

## (undated) DEVICE — SOL WATER IRRIG 500ML BOTTLE 2F7113

## (undated) DEVICE — GLOVE BIOGEL PI MICRO SZ 6.5 48565

## (undated) DEVICE — ESU PENCIL SMOKE EVAC W/ROCKER SWITCH 0703-047-000

## (undated) DEVICE — PREP CHLORHEXIDINE 4% 4OZ (HIBICLENS) 57504

## (undated) DEVICE — ESU CLEANER TIP 31142717

## (undated) DEVICE — SU MONOCRYL 4-0 PS-2 27" UND Y426H

## (undated) DEVICE — BNDG ELASTIC 6"X5YDS UNSTERILE 6611-60

## (undated) DEVICE — SOL NACL 0.9% IRRIG 500ML BOTTLE 2F7123

## (undated) DEVICE — DRAPE IOBAN INCISE 23X17" 6650EZ

## (undated) DEVICE — SUCTION SLEEVE NEPTUNE 2 165MM 0703-005-165

## (undated) DEVICE — DRAPE SHEET REV FOLD 3/4 9349

## (undated) RX ORDER — CEFAZOLIN SODIUM 2 G/50ML
SOLUTION INTRAVENOUS
Status: DISPENSED
Start: 2023-12-06

## (undated) RX ORDER — DEXAMETHASONE SODIUM PHOSPHATE 4 MG/ML
INJECTION, SOLUTION INTRA-ARTICULAR; INTRALESIONAL; INTRAMUSCULAR; INTRAVENOUS; SOFT TISSUE
Status: DISPENSED
Start: 2023-12-06

## (undated) RX ORDER — GENTAMICIN 40 MG/ML
INJECTION, SOLUTION INTRAMUSCULAR; INTRAVENOUS
Status: DISPENSED
Start: 2023-12-06

## (undated) RX ORDER — GLYCOPYRROLATE 0.2 MG/ML
INJECTION INTRAMUSCULAR; INTRAVENOUS
Status: DISPENSED
Start: 2023-12-06

## (undated) RX ORDER — PROPOFOL 10 MG/ML
INJECTION, EMULSION INTRAVENOUS
Status: DISPENSED
Start: 2023-12-06

## (undated) RX ORDER — FENTANYL CITRATE 50 UG/ML
INJECTION, SOLUTION INTRAMUSCULAR; INTRAVENOUS
Status: DISPENSED
Start: 2023-12-06

## (undated) RX ORDER — ONDANSETRON 2 MG/ML
INJECTION INTRAMUSCULAR; INTRAVENOUS
Status: DISPENSED
Start: 2023-12-06

## (undated) RX ORDER — HYDROMORPHONE HYDROCHLORIDE 1 MG/ML
INJECTION, SOLUTION INTRAMUSCULAR; INTRAVENOUS; SUBCUTANEOUS
Status: DISPENSED
Start: 2023-12-06

## (undated) RX ORDER — FENTANYL CITRATE-0.9 % NACL/PF 10 MCG/ML
PLASTIC BAG, INJECTION (ML) INTRAVENOUS
Status: DISPENSED
Start: 2023-12-06

## (undated) RX ORDER — TRANEXAMIC ACID 100 MG/ML
INJECTION, SOLUTION INTRAVENOUS
Status: DISPENSED
Start: 2023-12-06

## (undated) RX ORDER — CEFAZOLIN SODIUM 1 G/3ML
INJECTION, POWDER, FOR SOLUTION INTRAMUSCULAR; INTRAVENOUS
Status: DISPENSED
Start: 2023-12-06

## (undated) RX ORDER — OXYCODONE HYDROCHLORIDE 5 MG/1
TABLET ORAL
Status: DISPENSED
Start: 2023-12-06